# Patient Record
Sex: FEMALE | HISPANIC OR LATINO | Employment: UNEMPLOYED | URBAN - METROPOLITAN AREA
[De-identification: names, ages, dates, MRNs, and addresses within clinical notes are randomized per-mention and may not be internally consistent; named-entity substitution may affect disease eponyms.]

---

## 2020-11-28 ENCOUNTER — HOSPITAL ENCOUNTER (EMERGENCY)
Facility: HOSPITAL | Age: 50
Discharge: HOME | End: 2020-11-29
Admitting: EMERGENCY MEDICINE

## 2020-11-28 DIAGNOSIS — R53.83 FATIGUE, UNSPECIFIED TYPE: Primary | ICD-10-CM

## 2020-11-28 DIAGNOSIS — J40 BRONCHITIS: ICD-10-CM

## 2020-11-28 DIAGNOSIS — R06.00 DYSPNEA, UNSPECIFIED TYPE: ICD-10-CM

## 2020-11-28 DIAGNOSIS — R07.9 CHEST PAIN, UNSPECIFIED TYPE: ICD-10-CM

## 2020-11-28 LAB
ALBUMIN SERPL-MCNC: 4.7 G/DL (ref 3.5–5.2)
ALBUMIN/GLOB SERPL: 2.1 G/DL
ALP SERPL-CCNC: 88 U/L (ref 39–117)
ALT SERPL W P-5'-P-CCNC: 57 U/L (ref 1–33)
ANION GAP SERPL CALCULATED.3IONS-SCNC: 8 MMOL/L (ref 5–15)
AST SERPL-CCNC: 29 U/L (ref 1–32)
BASOPHILS # BLD AUTO: 0 10*3/MM3 (ref 0–0.2)
BASOPHILS NFR BLD AUTO: 0.6 % (ref 0–1.5)
BILIRUB SERPL-MCNC: 0.5 MG/DL (ref 0–1.2)
BUN SERPL-MCNC: 14 MG/DL (ref 6–20)
BUN/CREAT SERPL: 16.9 (ref 7–25)
CALCIUM SPEC-SCNC: 9.1 MG/DL (ref 8.6–10.5)
CHLORIDE SERPL-SCNC: 107 MMOL/L (ref 98–107)
CO2 SERPL-SCNC: 26 MMOL/L (ref 22–29)
CREAT SERPL-MCNC: 0.83 MG/DL (ref 0.57–1)
DEPRECATED RDW RBC AUTO: 42 FL (ref 37–54)
EOSINOPHIL # BLD AUTO: 0.2 10*3/MM3 (ref 0–0.4)
EOSINOPHIL NFR BLD AUTO: 2.5 % (ref 0.3–6.2)
ERYTHROCYTE [DISTWIDTH] IN BLOOD BY AUTOMATED COUNT: 12.7 % (ref 12.3–15.4)
GFR SERPL CREATININE-BSD FRML MDRD: 73 ML/MIN/1.73
GFR SERPL CREATININE-BSD FRML MDRD: 88 ML/MIN/1.73
GLOBULIN UR ELPH-MCNC: 2.2 GM/DL
GLUCOSE SERPL-MCNC: 124 MG/DL (ref 65–99)
HCT VFR BLD AUTO: 40.6 % (ref 34–46.6)
HGB BLD-MCNC: 13.4 G/DL (ref 12–15.9)
LYMPHOCYTES # BLD AUTO: 2.1 10*3/MM3 (ref 0.7–3.1)
LYMPHOCYTES NFR BLD AUTO: 26.3 % (ref 19.6–45.3)
MCH RBC QN AUTO: 31 PG (ref 26.6–33)
MCHC RBC AUTO-ENTMCNC: 33 G/DL (ref 31.5–35.7)
MCV RBC AUTO: 93.7 FL (ref 79–97)
MONOCYTES # BLD AUTO: 0.7 10*3/MM3 (ref 0.1–0.9)
MONOCYTES NFR BLD AUTO: 8.3 % (ref 5–12)
NEUTROPHILS NFR BLD AUTO: 4.9 10*3/MM3 (ref 1.7–7)
NEUTROPHILS NFR BLD AUTO: 62.3 % (ref 42.7–76)
NRBC BLD AUTO-RTO: 0.1 /100 WBC (ref 0–0.2)
PLATELET # BLD AUTO: 388 10*3/MM3 (ref 140–450)
PMV BLD AUTO: 7.8 FL (ref 6–12)
POTASSIUM SERPL-SCNC: 4.1 MMOL/L (ref 3.5–5.2)
PROT SERPL-MCNC: 6.9 G/DL (ref 6–8.5)
RBC # BLD AUTO: 4.33 10*6/MM3 (ref 3.77–5.28)
SODIUM SERPL-SCNC: 141 MMOL/L (ref 136–145)
TROPONIN T SERPL-MCNC: <0.01 NG/ML (ref 0–0.03)
WBC # BLD AUTO: 7.8 10*3/MM3 (ref 3.4–10.8)

## 2020-11-28 PROCEDURE — 80053 COMPREHEN METABOLIC PANEL: CPT | Performed by: EMERGENCY MEDICINE

## 2020-11-28 PROCEDURE — 87040 BLOOD CULTURE FOR BACTERIA: CPT | Performed by: EMERGENCY MEDICINE

## 2020-11-28 PROCEDURE — 85652 RBC SED RATE AUTOMATED: CPT | Performed by: EMERGENCY MEDICINE

## 2020-11-28 PROCEDURE — 85025 COMPLETE CBC W/AUTO DIFF WBC: CPT | Performed by: EMERGENCY MEDICINE

## 2020-11-28 PROCEDURE — 84484 ASSAY OF TROPONIN QUANT: CPT | Performed by: EMERGENCY MEDICINE

## 2020-11-28 PROCEDURE — 99283 EMERGENCY DEPT VISIT LOW MDM: CPT

## 2020-11-28 RX ADMIN — SODIUM CHLORIDE 500 ML: 0.9 INJECTION, SOLUTION INTRAVENOUS at 23:00

## 2020-11-29 ENCOUNTER — APPOINTMENT (OUTPATIENT)
Dept: CT IMAGING | Facility: HOSPITAL | Age: 50
End: 2020-11-29

## 2020-11-29 VITALS
RESPIRATION RATE: 18 BRPM | TEMPERATURE: 97 F | DIASTOLIC BLOOD PRESSURE: 70 MMHG | HEIGHT: 62 IN | SYSTOLIC BLOOD PRESSURE: 136 MMHG | BODY MASS INDEX: 31.93 KG/M2 | WEIGHT: 173.5 LBS | OXYGEN SATURATION: 98 % | HEART RATE: 80 BPM

## 2020-11-29 LAB
BILIRUB UR QL STRIP: NEGATIVE
CLARITY UR: CLEAR
COLOR UR: YELLOW
ERYTHROCYTE [SEDIMENTATION RATE] IN BLOOD: 9 MM/HR (ref 0–30)
GLUCOSE UR STRIP-MCNC: NEGATIVE MG/DL
HGB UR QL STRIP.AUTO: NEGATIVE
KETONES UR QL STRIP: NEGATIVE
LEUKOCYTE ESTERASE UR QL STRIP.AUTO: NEGATIVE
NITRITE UR QL STRIP: NEGATIVE
PH UR STRIP.AUTO: 7 [PH] (ref 5–8)
PROT UR QL STRIP: NEGATIVE
SP GR UR STRIP: 1.01 (ref 1–1.03)
UROBILINOGEN UR QL STRIP: NORMAL

## 2020-11-29 PROCEDURE — 81003 URINALYSIS AUTO W/O SCOPE: CPT | Performed by: EMERGENCY MEDICINE

## 2020-11-29 PROCEDURE — 96374 THER/PROPH/DIAG INJ IV PUSH: CPT

## 2020-11-29 PROCEDURE — 0 IOPAMIDOL PER 1 ML: Performed by: EMERGENCY MEDICINE

## 2020-11-29 PROCEDURE — 71275 CT ANGIOGRAPHY CHEST: CPT

## 2020-11-29 RX ORDER — METHYLPREDNISOLONE 4 MG/1
TABLET ORAL
Qty: 21 TABLET | Refills: 0 | Status: SHIPPED | OUTPATIENT
Start: 2020-11-29 | End: 2021-01-11

## 2020-11-29 RX ORDER — ALBUTEROL SULFATE 90 UG/1
2 AEROSOL, METERED RESPIRATORY (INHALATION) EVERY 4 HOURS PRN
Qty: 1 G | Refills: 0 | Status: SHIPPED | OUTPATIENT
Start: 2020-11-29 | End: 2020-12-06

## 2020-11-29 RX ORDER — LABETALOL HYDROCHLORIDE 5 MG/ML
20 INJECTION, SOLUTION INTRAVENOUS ONCE
Status: COMPLETED | OUTPATIENT
Start: 2020-11-29 | End: 2020-11-29

## 2020-11-29 RX ADMIN — LABETALOL 20 MG/4 ML (5 MG/ML) INTRAVENOUS SYRINGE 20 MG: at 00:45

## 2020-11-29 RX ADMIN — IOPAMIDOL 100 ML: 755 INJECTION, SOLUTION INTRAVENOUS at 01:24

## 2020-12-03 LAB
BACTERIA SPEC AEROBE CULT: NORMAL
BACTERIA SPEC AEROBE CULT: NORMAL

## 2021-01-11 ENCOUNTER — APPOINTMENT (OUTPATIENT)
Dept: GENERAL RADIOLOGY | Facility: HOSPITAL | Age: 51
End: 2021-01-11

## 2021-01-11 ENCOUNTER — APPOINTMENT (OUTPATIENT)
Dept: CARDIOLOGY | Facility: HOSPITAL | Age: 51
End: 2021-01-11

## 2021-01-11 ENCOUNTER — HOSPITAL ENCOUNTER (OUTPATIENT)
Facility: HOSPITAL | Age: 51
Setting detail: OBSERVATION
Discharge: HOME | End: 2021-01-14
Attending: EMERGENCY MEDICINE | Admitting: INTERNAL MEDICINE

## 2021-01-11 DIAGNOSIS — R00.1 SINUS BRADYCARDIA: ICD-10-CM

## 2021-01-11 DIAGNOSIS — R07.9 CHEST PAIN, UNSPECIFIED TYPE: Primary | ICD-10-CM

## 2021-01-11 LAB
ANION GAP SERPL CALCULATED.3IONS-SCNC: 11 MMOL/L (ref 5–15)
APTT PPP: 29.2 SECONDS (ref 24–31)
BASOPHILS # BLD AUTO: 0 10*3/MM3 (ref 0–0.2)
BASOPHILS NFR BLD AUTO: 0.3 % (ref 0–1.5)
BH CV ECHO MEAS - ACS: 2 CM
BH CV ECHO MEAS - AO MAX PG (FULL): 3.4 MMHG
BH CV ECHO MEAS - AO MAX PG: 8.1 MMHG
BH CV ECHO MEAS - AO MEAN PG (FULL): 1.6 MMHG
BH CV ECHO MEAS - AO MEAN PG: 3.4 MMHG
BH CV ECHO MEAS - AO ROOT AREA (BSA CORRECTED): 1.7
BH CV ECHO MEAS - AO ROOT AREA: 6.8 CM^2
BH CV ECHO MEAS - AO ROOT DIAM: 2.9 CM
BH CV ECHO MEAS - AO V2 MAX: 142.1 CM/SEC
BH CV ECHO MEAS - AO V2 MEAN: 84.1 CM/SEC
BH CV ECHO MEAS - AO V2 VTI: 29.8 CM
BH CV ECHO MEAS - AORTIC HR: 49.3 BPM
BH CV ECHO MEAS - AORTIC R-R: 1.2 SEC
BH CV ECHO MEAS - ASC AORTA: 2.6 CM
BH CV ECHO MEAS - AVA(I,A): 2.3 CM^2
BH CV ECHO MEAS - AVA(I,D): 2.3 CM^2
BH CV ECHO MEAS - AVA(V,A): 2.3 CM^2
BH CV ECHO MEAS - AVA(V,D): 2.3 CM^2
BH CV ECHO MEAS - BSA(HAYCOCK): 1.8 M^2
BH CV ECHO MEAS - BSA: 1.7 M^2
BH CV ECHO MEAS - BZI_BMI: 27.8 KILOGRAMS/M^2
BH CV ECHO MEAS - BZI_METRIC_HEIGHT: 160 CM
BH CV ECHO MEAS - BZI_METRIC_WEIGHT: 71.2 KG
BH CV ECHO MEAS - CI(AO): 5.7 L/MIN/M^2
BH CV ECHO MEAS - CI(LVOT): 1.9 L/MIN/M^2
BH CV ECHO MEAS - CO(AO): 10 L/MIN
BH CV ECHO MEAS - CO(LVOT): 3.4 L/MIN
BH CV ECHO MEAS - EDV(CUBED): 125.9 ML
BH CV ECHO MEAS - EDV(MOD-SP4): 47.9 ML
BH CV ECHO MEAS - EDV(TEICH): 118.9 ML
BH CV ECHO MEAS - EF(CUBED): 75.9 %
BH CV ECHO MEAS - EF(MOD-BP): 65 %
BH CV ECHO MEAS - EF(MOD-SP4): 64.8 %
BH CV ECHO MEAS - EF(TEICH): 67.6 %
BH CV ECHO MEAS - ESV(CUBED): 30.3 ML
BH CV ECHO MEAS - ESV(MOD-SP4): 16.9 ML
BH CV ECHO MEAS - ESV(TEICH): 38.5 ML
BH CV ECHO MEAS - FS: 37.8 %
BH CV ECHO MEAS - IVS/LVPW: 0.65
BH CV ECHO MEAS - IVSD: 0.73 CM
BH CV ECHO MEAS - LA DIMENSION(2D): 3.3 CM
BH CV ECHO MEAS - LV DIASTOLIC VOL/BSA (35-75): 27.4 ML/M^2
BH CV ECHO MEAS - LV MASS(C)D: 164.8 GRAMS
BH CV ECHO MEAS - LV MASS(C)DI: 94.5 GRAMS/M^2
BH CV ECHO MEAS - LV MAX PG: 4.7 MMHG
BH CV ECHO MEAS - LV MEAN PG: 1.9 MMHG
BH CV ECHO MEAS - LV SYSTOLIC VOL/BSA (12-30): 9.7 ML/M^2
BH CV ECHO MEAS - LV V1 MAX: 108.7 CM/SEC
BH CV ECHO MEAS - LV V1 MEAN: 60.3 CM/SEC
BH CV ECHO MEAS - LV V1 VTI: 23 CM
BH CV ECHO MEAS - LVIDD: 5 CM
BH CV ECHO MEAS - LVIDS: 3.1 CM
BH CV ECHO MEAS - LVOT AREA: 3 CM^2
BH CV ECHO MEAS - LVOT DIAM: 1.9 CM
BH CV ECHO MEAS - LVPWD: 1.1 CM
BH CV ECHO MEAS - MV A MAX VEL: 51.9 CM/SEC
BH CV ECHO MEAS - MV DEC SLOPE: 156.5 CM/SEC^2
BH CV ECHO MEAS - MV DEC TIME: 0.37 SEC
BH CV ECHO MEAS - MV E MAX VEL: 58.7 CM/SEC
BH CV ECHO MEAS - MV E/A: 1.1
BH CV ECHO MEAS - MV MAX PG: 1.9 MMHG
BH CV ECHO MEAS - MV MEAN PG: 0.67 MMHG
BH CV ECHO MEAS - MV V2 MAX: 69.6 CM/SEC
BH CV ECHO MEAS - MV V2 MEAN: 38.3 CM/SEC
BH CV ECHO MEAS - MV V2 VTI: 28.6 CM
BH CV ECHO MEAS - MVA(VTI): 2.4 CM^2
BH CV ECHO MEAS - PA ACC TIME: 0.15 SEC
BH CV ECHO MEAS - PA MAX PG (FULL): 1.3 MMHG
BH CV ECHO MEAS - PA MAX PG: 3.4 MMHG
BH CV ECHO MEAS - PA MEAN PG (FULL): 0.82 MMHG
BH CV ECHO MEAS - PA MEAN PG: 1.9 MMHG
BH CV ECHO MEAS - PA PR(ACCEL): 12.2 MMHG
BH CV ECHO MEAS - PA V2 MAX: 91.8 CM/SEC
BH CV ECHO MEAS - PA V2 MEAN: 64 CM/SEC
BH CV ECHO MEAS - PA V2 VTI: 23.9 CM
BH CV ECHO MEAS - PI END-D VEL: 99.4 CM/SEC
BH CV ECHO MEAS - PI MAX PG: 8.4 MMHG
BH CV ECHO MEAS - PI MAX VEL: 144.6 CM/SEC
BH CV ECHO MEAS - PVA(I,A): 3.9 CM^2
BH CV ECHO MEAS - PVA(I,D): 3.9 CM^2
BH CV ECHO MEAS - PVA(V,A): 4 CM^2
BH CV ECHO MEAS - PVA(V,D): 4 CM^2
BH CV ECHO MEAS - QP/QS: 1.4
BH CV ECHO MEAS - RAP SYSTOLE: 3 MMHG
BH CV ECHO MEAS - RV MAX PG: 2 MMHG
BH CV ECHO MEAS - RV MEAN PG: 1 MMHG
BH CV ECHO MEAS - RV V1 MAX: 71.3 CM/SEC
BH CV ECHO MEAS - RV V1 MEAN: 46.9 CM/SEC
BH CV ECHO MEAS - RV V1 VTI: 18.4 CM
BH CV ECHO MEAS - RVDD: 2.1 CM
BH CV ECHO MEAS - RVOT AREA: 5.1 CM^2
BH CV ECHO MEAS - RVOT DIAM: 2.5 CM
BH CV ECHO MEAS - RVSP: 15.9 MMHG
BH CV ECHO MEAS - SI(AO): 116.6 ML/M^2
BH CV ECHO MEAS - SI(CUBED): 54.7 ML/M^2
BH CV ECHO MEAS - SI(LVOT): 39 ML/M^2
BH CV ECHO MEAS - SI(MOD-SP4): 17.8 ML/M^2
BH CV ECHO MEAS - SI(TEICH): 46.1 ML/M^2
BH CV ECHO MEAS - SV(AO): 203.4 ML
BH CV ECHO MEAS - SV(CUBED): 95.5 ML
BH CV ECHO MEAS - SV(LVOT): 68.1 ML
BH CV ECHO MEAS - SV(MOD-SP4): 31 ML
BH CV ECHO MEAS - SV(RVOT): 93.6 ML
BH CV ECHO MEAS - SV(TEICH): 80.4 ML
BH CV ECHO MEAS - TR MAX VEL: 179.4 CM/SEC
BUN SERPL-MCNC: 18 MG/DL (ref 6–20)
BUN/CREAT SERPL: 25 (ref 7–25)
CALCIUM SPEC-SCNC: 8.9 MG/DL (ref 8.6–10.5)
CHLORIDE SERPL-SCNC: 103 MMOL/L (ref 98–107)
CHOLEST SERPL-MCNC: 127 MG/DL (ref 0–200)
CK SERPL-CCNC: 73 U/L (ref 20–180)
CO2 SERPL-SCNC: 25 MMOL/L (ref 22–29)
CREAT SERPL-MCNC: 0.72 MG/DL (ref 0.57–1)
DEPRECATED RDW RBC AUTO: 42.4 FL (ref 37–54)
EOSINOPHIL # BLD AUTO: 0.1 10*3/MM3 (ref 0–0.4)
EOSINOPHIL NFR BLD AUTO: 1.1 % (ref 0.3–6.2)
ERYTHROCYTE [DISTWIDTH] IN BLOOD BY AUTOMATED COUNT: 13.2 % (ref 12.3–15.4)
GFR SERPL CREATININE-BSD FRML MDRD: 104 ML/MIN/1.73
GFR SERPL CREATININE-BSD FRML MDRD: 86 ML/MIN/1.73
GLUCOSE SERPL-MCNC: 95 MG/DL (ref 65–99)
HCT VFR BLD AUTO: 40.2 % (ref 34–46.6)
HDLC SERPL-MCNC: 43 MG/DL (ref 40–60)
HGB BLD-MCNC: 13.6 G/DL (ref 12–15.9)
INR PPP: 1.06 (ref 0.93–1.1)
LDLC SERPL CALC-MCNC: 70 MG/DL (ref 0–100)
LDLC/HDLC SERPL: 1.63 {RATIO}
LYMPHOCYTES # BLD AUTO: 1.5 10*3/MM3 (ref 0.7–3.1)
LYMPHOCYTES NFR BLD AUTO: 20.4 % (ref 19.6–45.3)
MAGNESIUM SERPL-MCNC: 2 MG/DL (ref 1.6–2.6)
MCH RBC QN AUTO: 31.1 PG (ref 26.6–33)
MCHC RBC AUTO-ENTMCNC: 33.8 G/DL (ref 31.5–35.7)
MCV RBC AUTO: 92 FL (ref 79–97)
MONOCYTES # BLD AUTO: 0.4 10*3/MM3 (ref 0.1–0.9)
MONOCYTES NFR BLD AUTO: 5.8 % (ref 5–12)
NEUTROPHILS NFR BLD AUTO: 5.3 10*3/MM3 (ref 1.7–7)
NEUTROPHILS NFR BLD AUTO: 72.4 % (ref 42.7–76)
NRBC BLD AUTO-RTO: 0.1 /100 WBC (ref 0–0.2)
NT-PROBNP SERPL-MCNC: 28 PG/ML (ref 0–900)
PHOSPHATE SERPL-MCNC: 4 MG/DL (ref 2.5–4.5)
PLATELET # BLD AUTO: 243 10*3/MM3 (ref 140–450)
PMV BLD AUTO: 8.8 FL (ref 6–12)
POTASSIUM SERPL-SCNC: 4.2 MMOL/L (ref 3.5–5.2)
PROTHROMBIN TIME: 11.6 SECONDS (ref 9.6–11.7)
QT INTERVAL: 436 MS
RBC # BLD AUTO: 4.38 10*6/MM3 (ref 3.77–5.28)
SODIUM SERPL-SCNC: 139 MMOL/L (ref 136–145)
TRIGL SERPL-MCNC: 70 MG/DL (ref 0–150)
TROPONIN T SERPL-MCNC: <0.01 NG/ML (ref 0–0.03)
TROPONIN T SERPL-MCNC: <0.01 NG/ML (ref 0–0.03)
VLDLC SERPL-MCNC: 14 MG/DL (ref 5–40)
WBC # BLD AUTO: 7.4 10*3/MM3 (ref 3.4–10.8)

## 2021-01-11 PROCEDURE — G0378 HOSPITAL OBSERVATION PER HR: HCPCS

## 2021-01-11 PROCEDURE — 83735 ASSAY OF MAGNESIUM: CPT | Performed by: INTERNAL MEDICINE

## 2021-01-11 PROCEDURE — 84484 ASSAY OF TROPONIN QUANT: CPT | Performed by: EMERGENCY MEDICINE

## 2021-01-11 PROCEDURE — 83880 ASSAY OF NATRIURETIC PEPTIDE: CPT | Performed by: INTERNAL MEDICINE

## 2021-01-11 PROCEDURE — 80061 LIPID PANEL: CPT | Performed by: INTERNAL MEDICINE

## 2021-01-11 PROCEDURE — 85025 COMPLETE CBC W/AUTO DIFF WBC: CPT | Performed by: EMERGENCY MEDICINE

## 2021-01-11 PROCEDURE — 93005 ELECTROCARDIOGRAM TRACING: CPT | Performed by: EMERGENCY MEDICINE

## 2021-01-11 PROCEDURE — 82550 ASSAY OF CK (CPK): CPT | Performed by: INTERNAL MEDICINE

## 2021-01-11 PROCEDURE — 93306 TTE W/DOPPLER COMPLETE: CPT

## 2021-01-11 PROCEDURE — 80048 BASIC METABOLIC PNL TOTAL CA: CPT | Performed by: EMERGENCY MEDICINE

## 2021-01-11 PROCEDURE — 84100 ASSAY OF PHOSPHORUS: CPT | Performed by: INTERNAL MEDICINE

## 2021-01-11 PROCEDURE — 71045 X-RAY EXAM CHEST 1 VIEW: CPT

## 2021-01-11 PROCEDURE — 99219 PR INITIAL OBSERVATION CARE/DAY 50 MINUTES: CPT | Performed by: INTERNAL MEDICINE

## 2021-01-11 PROCEDURE — 99284 EMERGENCY DEPT VISIT MOD MDM: CPT

## 2021-01-11 PROCEDURE — 84484 ASSAY OF TROPONIN QUANT: CPT | Performed by: INTERNAL MEDICINE

## 2021-01-11 PROCEDURE — 93306 TTE W/DOPPLER COMPLETE: CPT | Performed by: INTERNAL MEDICINE

## 2021-01-11 PROCEDURE — 85610 PROTHROMBIN TIME: CPT | Performed by: EMERGENCY MEDICINE

## 2021-01-11 PROCEDURE — 85730 THROMBOPLASTIN TIME PARTIAL: CPT | Performed by: EMERGENCY MEDICINE

## 2021-01-11 RX ORDER — SODIUM CHLORIDE 0.9 % (FLUSH) 0.9 %
10 SYRINGE (ML) INJECTION EVERY 12 HOURS SCHEDULED
Status: DISCONTINUED | OUTPATIENT
Start: 2021-01-11 | End: 2021-01-14 | Stop reason: HOSPADM

## 2021-01-11 RX ORDER — ENALAPRIL MALEATE 10 MG/1
10 TABLET ORAL DAILY
COMMUNITY

## 2021-01-11 RX ORDER — SODIUM CHLORIDE 0.9 % (FLUSH) 0.9 %
10 SYRINGE (ML) INJECTION AS NEEDED
Status: DISCONTINUED | OUTPATIENT
Start: 2021-01-11 | End: 2021-01-14 | Stop reason: HOSPADM

## 2021-01-11 RX ADMIN — Medication 10 ML: at 20:00

## 2021-01-11 NOTE — H&P
[facility] Medicine Services      Patient Name: Julisa Marinelli  : 1970  MRN: [id number]  Primary Care Physician: Alexandru, No Known  Date of admission: 2021    Patient Care Team:  Provider, No Known as PCP - General          Subjective   History Present Illness     Chief Complaint:   Chief Complaint   Patient presents with   • Slow Heart Rate     dizzy, chest pain, sent in by [facility] states her heart rate there was 38       Ms. Matthew Marinelli is a 50 y.o.  hyspanic female with PMH significant for HTN,  presented with a slow heart rate.  Patient was at her primary doctor's office and had a heart rate in the 30s and they sent her to the emergency room.  Patient states she has been having episodes of chest pressure, shortness of breath, dizziness that are worse with exertion.  She denies any history of heart disease.  She denies any other alleviating or exacerbating factors.  She denies any chest pain currently.    Family history non contributory   No Tobaco or ETOH    Review of Systems   Constitution: Negative.   HENT: Negative.    Eyes: Negative.    Cardiovascular: Positive for chest pain.   Respiratory: Negative.    Endocrine: Negative.    Hematologic/Lymphatic: Negative.    Skin: Negative.    Musculoskeletal: Negative.    Gastrointestinal: Negative.    Genitourinary: Negative.    Neurological: Negative.    Psychiatric/Behavioral: Negative.    Allergic/Immunologic: Negative.    All other systems reviewed and are negative.    Personal History     Past Medical History:   Past Medical History:   Diagnosis Date   • Hypertension      Surgical History:    History reviewed. No pertinent surgical history.    Family History: family history is not on file. Otherwise pertinent FHx was reviewed and unremarkable.     Social History:  reports that she has never smoked. She has never used smokeless tobacco. She reports previous alcohol use. She reports previous drug  use.    Medications:  Prior to Admission medications    Medication Sig Start Date End Date Taking? Authorizing Provider   ENALAPRIL MALEATE PO Take 10 mg by mouth Daily.   Yes Provider, MD Kandace   methylPREDNISolone (MEDROL) 4 MG dose pack Take as directed on package instructions. 11/29/20   Lexus Andre NP       Allergies:    Allergies   Allergen Reactions   • Penicillins Unknown - Low Severity       Objective   Objective     Vital Signs  Temp:  [97.9 °F (36.6 °C)] 97.9 °F (36.6 °C)  Heart Rate:  [42-61] 47  Resp:  [22] 22  BP: (103-141)/(53-85) 103/55  SpO2:  [96 %-100 %] 98 %  on   ;      Body mass index is 27.92 kg/m².    Physical Exam  Vitals signs and nursing note reviewed.   Constitutional:       General: She is not in acute distress.     Appearance: Normal appearance. She is well-developed. She is not ill-appearing, toxic-appearing or diaphoretic.   HENT:      Head: Normocephalic and atraumatic.      Nose: Nose normal. No congestion or rhinorrhea.      Mouth/Throat:      Mouth: Mucous membranes are moist.      Pharynx: No oropharyngeal exudate.   Eyes:      General: No scleral icterus.        Right eye: No discharge.         Left eye: No discharge.      Extraocular Movements: Extraocular movements intact.      Conjunctiva/sclera: Conjunctivae normal.      Pupils: Pupils are equal, round, and reactive to light.   Neck:      Musculoskeletal: Normal range of motion and neck supple.      Thyroid: No thyromegaly.      Vascular: No JVD.      Trachea: No tracheal deviation.   Cardiovascular:      Rate and Rhythm: Normal rate and regular rhythm.      Pulses: Normal pulses.      Heart sounds: Normal heart sounds. No murmur. No gallop.    Pulmonary:      Effort: Pulmonary effort is normal. No respiratory distress.      Breath sounds: Normal breath sounds. No wheezing or rales.   Abdominal:      General: Bowel sounds are normal. There is no distension.      Palpations: Abdomen is soft.      Tenderness: There  is no abdominal tenderness. There is no guarding.   Musculoskeletal: Normal range of motion.         General: No tenderness, deformity or signs of injury.      Right lower leg: No edema.      Left lower leg: No edema.   Skin:     General: Skin is warm and dry.      Coloration: Skin is not jaundiced or pale.      Findings: No bruising, erythema or rash.   Neurological:      General: No focal deficit present.      Mental Status: She is alert and oriented to person, place, and time. Mental status is at baseline.      Cranial Nerves: No cranial nerve deficit.      Sensory: No sensory deficit.      Motor: No weakness or abnormal muscle tone.      Coordination: Coordination normal.   Psychiatric:         Mood and Affect: Mood normal.         Behavior: Behavior normal.         Thought Content: Thought content normal.         Judgment: Judgment normal.       Results Review:  I have personally reviewed most recent lab results and agree with findings.    Results from last 7 days   Lab Units 01/11/21  1409   WBC 10*3/mm3 7.40   HEMOGLOBIN g/dL 13.6   HEMATOCRIT % 40.2   PLATELETS 10*3/mm3 243   INR  1.06     Results from last 7 days   Lab Units 01/11/21  1409   SODIUM mmol/L 139   POTASSIUM mmol/L 4.2   CHLORIDE mmol/L 103   CO2 mmol/L 25.0   BUN mg/dL 18   CREATININE mg/dL 0.72   GLUCOSE mg/dL 95   CALCIUM mg/dL 8.9   TROPONIN T ng/mL <0.010     Estimated Creatinine Clearance: 88.5 mL/min (by C-G formula based on SCr of 0.72 mg/dL).  Brief Urine Lab Results  (Last result in the past 365 days)      Color   Clarity   Blood   Leuk Est   Nitrite   Protein   CREAT   Urine HCG        11/29/20 0034 Yellow Clear Negative Negative Negative Negative               Microbiology Results (last 10 days)     ** No results found for the last 240 hours. **          ECG/EMG Results (most recent)     Procedure Component Value Units Date/Time    ECG 12 Lead [[id number]] Collected: 01/11/21 1345     Updated: 01/11/21 1348     QT Interval 436 ms      Narrative:      HEART RATE= 46  bpm  RR Interval= 1304  ms  SC Interval= 153  ms  P Horizontal Axis= 16  deg  P Front Axis= 35  deg  QRSD Interval= 86  ms  QT Interval= 436  ms  QRS Axis= 56  deg  T Wave Axis= 28  deg  - OTHERWISE NORMAL ECG -  Sinus bradycardia  Electronically Signed By:   Date and Time of Study: 2021-01-11 13:45:52                    Xr Chest 1 View    Result Date: 1/11/2021  No acute chest finding.  Electronically Signed By-Silivna Wing MD On:1/11/2021 2:02 PM This report was finalized on [id number] by  Silvina Wing MD.        Estimated Creatinine Clearance: 88.5 mL/min (by C-G formula based on SCr of 0.72 mg/dL).    Assessment/Plan   Assessment/Plan     Active Hospital Problems    Diagnosis  POA   • Chest pain [R07.9]  Yes      Resolved Hospital Problems   No resolved problems to display.     Bradycardia-  Hypertension -    Plan-  Serial enzymes   Stress test  Holter monitor   Cardiology consulted  Echocardiogram       VTE Prophylaxis -   Mechanical Order History:     None      Pharmalogical Order History:     None          CODE STATUS:    There are no questions and answers to display.       This patient has been examined wearing appropriate Personal Protective Equipment and discussed with hospital infection control department. 01/11/21      I discussed the patient's findings and my recommendations with patient and family.      Signature:Electronically signed by Edilberto Caputo MD, 01/11/21, 4:40 PM EST.    [facility]ist Team

## 2021-01-11 NOTE — ED NOTES
Patient has a  in room with her, patients daughter Corinne Castro declined  on IPAD.  Patient states that she started feeling fatigued in November, in general not feeling well.  These symptoms continued and her blood pressure was up around 200 systolic.  The patient has dizziness, pressure on chest, bloated abdomen, complaining of belching and flatulence frequently, increased frequency of urination but does not urinate much.  Patient also has a dry cough when she feels the urge to urinate, she was told it had to do with a cyst or mass in her uterus. Pressure in ears, pain in throat and swollen lymph nodes, constipation.     Lorna Dodge, RN  01/11/21 4010

## 2021-01-11 NOTE — ED PROVIDER NOTES
"Subjective   Chief complaint: Slow heart rate    50-year-old female presents with a slow heart rate.  Patient was at her primary doctor's office and had a heart rate in the 30s and they sent her to the emergency room.  Patient states she has been having episodes of chest pressure, shortness of breath, dizziness that are worse with exertion.  She denies any history of heart disease.  She denies any other alleviating or exacerbating factors.  She denies any chest pain currently.      History provided by:  Patient      Review of Systems   Constitutional: Negative for fever.   HENT: Negative for congestion and sore throat.    Eyes: Negative for redness.   Respiratory: Positive for shortness of breath. Negative for cough.    Cardiovascular: Positive for chest pain.   Gastrointestinal: Negative for abdominal pain, diarrhea and vomiting.   Genitourinary: Negative for dysuria.   Musculoskeletal: Negative for back pain.   Skin: Negative for rash.   Neurological: Positive for dizziness. Negative for headaches.   Psychiatric/Behavioral: Negative for confusion.       Past Medical History:   Diagnosis Date   • Hypertension        Allergies   Allergen Reactions   • Penicillins Unknown - Low Severity       History reviewed. No pertinent surgical history.    History reviewed. No pertinent family history.    Social History     Socioeconomic History   • Marital status: Significant Other     Spouse name: Not on file   • Number of children: Not on file   • Years of education: Not on file   • Highest education level: Not on file   Tobacco Use   • Smoking status: Never Smoker   • Smokeless tobacco: Never Used   Substance and Sexual Activity   • Alcohol use: Not Currently     Frequency: Never   • Drug use: Not Currently   • Sexual activity: Defer       /53   Pulse (!) 45   Temp 97.9 °F (36.6 °C)   Resp 22   Ht 160 cm (63\")   Wt 71.5 kg (157 lb 10.1 oz)   LMP 12/16/2020   SpO2 98%   BMI 27.92 kg/m²       Objective   Physical " Exam  Vitals signs and nursing note reviewed.   Constitutional:       Appearance: She is well-developed.   HENT:      Head: Normocephalic and atraumatic.   Eyes:      Extraocular Movements: Extraocular movements intact.      Pupils: Pupils are equal, round, and reactive to light.   Neck:      Musculoskeletal: Normal range of motion and neck supple.   Cardiovascular:      Rate and Rhythm: Regular rhythm. Bradycardia present.      Heart sounds: Normal heart sounds.   Pulmonary:      Effort: Pulmonary effort is normal. No respiratory distress.      Breath sounds: Normal breath sounds.   Abdominal:      General: Bowel sounds are normal.      Palpations: Abdomen is soft.      Tenderness: There is no abdominal tenderness.   Musculoskeletal: Normal range of motion.   Skin:     General: Skin is warm and dry.   Neurological:      General: No focal deficit present.      Mental Status: She is alert and oriented to person, place, and time.         Procedures           ED Course      Results for orders placed or performed during the hospital encounter of 01/11/21   Basic Metabolic Panel    Specimen: Blood   Result Value Ref Range    Glucose 95 65 - 99 mg/dL    BUN 18 6 - 20 mg/dL    Creatinine 0.72 0.57 - 1.00 mg/dL    Sodium 139 136 - 145 mmol/L    Potassium 4.2 3.5 - 5.2 mmol/L    Chloride 103 98 - 107 mmol/L    CO2 25.0 22.0 - 29.0 mmol/L    Calcium 8.9 8.6 - 10.5 mg/dL    eGFR  African Amer 104 >60 mL/min/1.73    eGFR Non African Amer 86 >60 mL/min/1.73    BUN/Creatinine Ratio 25.0 7.0 - 25.0    Anion Gap 11.0 5.0 - 15.0 mmol/L   Protime-INR    Specimen: Blood   Result Value Ref Range    Protime 11.6 9.6 - 11.7 Seconds    INR 1.06 0.93 - 1.10   aPTT    Specimen: Blood   Result Value Ref Range    PTT 29.2 24.0 - 31.0 seconds   Troponin    Specimen: Blood   Result Value Ref Range    Troponin T <0.010 0.000 - 0.030 ng/mL   CBC Auto Differential    Specimen: Blood   Result Value Ref Range    WBC 7.40 3.40 - 10.80 10*3/mm3    RBC  4.38 3.77 - 5.28 10*6/mm3    Hemoglobin 13.6 12.0 - 15.9 g/dL    Hematocrit 40.2 34.0 - 46.6 %    MCV 92.0 79.0 - 97.0 fL    MCH 31.1 26.6 - 33.0 pg    MCHC 33.8 31.5 - 35.7 g/dL    RDW 13.2 12.3 - 15.4 %    RDW-SD 42.4 37.0 - 54.0 fl    MPV 8.8 6.0 - 12.0 fL    Platelets 243 140 - 450 10*3/mm3    Neutrophil % 72.4 42.7 - 76.0 %    Lymphocyte % 20.4 19.6 - 45.3 %    Monocyte % 5.8 5.0 - 12.0 %    Eosinophil % 1.1 0.3 - 6.2 %    Basophil % 0.3 0.0 - 1.5 %    Neutrophils, Absolute 5.30 1.70 - 7.00 10*3/mm3    Lymphocytes, Absolute 1.50 0.70 - 3.10 10*3/mm3    Monocytes, Absolute 0.40 0.10 - 0.90 10*3/mm3    Eosinophils, Absolute 0.10 0.00 - 0.40 10*3/mm3    Basophils, Absolute 0.00 0.00 - 0.20 10*3/mm3    nRBC 0.1 0.0 - 0.2 /100 WBC   ECG 12 Lead   Result Value Ref Range    QT Interval 436 ms     Xr Chest 1 View    Result Date: 1/11/2021  No acute chest finding.  Electronically Signed By-Silvina Wing MD On:1/11/2021 2:02 PM This report was finalized on [id number] by  Silvina Wing MD.         My interpretation of EKG shows sinus bradycardia, rate of 46, no ST elevation                                MDM   Patient had the above evaluation.  Results were discussed with the patient.  Patient remained well-appearing in the emergency room.  She does have bradycardia with a heart rate in the 40s.  EKG just shows sinus bradycardia with no other abnormalities.  Troponin is negative.  Chest x-ray shows no acute disease.  Patient will be admitted for further evaluation of her chest pain and bradycardia.  I discussed with the hospitalist who agreed to admit.      Final diagnoses:   Chest pain, unspecified type   Sinus bradycardia            Leoncio Byrd MD  01/11/21 1559

## 2021-01-12 LAB
MAGNESIUM SERPL-MCNC: 2.3 MG/DL (ref 1.6–2.6)
PHOSPHATE SERPL-MCNC: 3.8 MG/DL (ref 2.5–4.5)
SARS-COV-2 RNA PNL SPEC NAA+PROBE: DETECTED

## 2021-01-12 PROCEDURE — U0003 INFECTIOUS AGENT DETECTION BY NUCLEIC ACID (DNA OR RNA); SEVERE ACUTE RESPIRATORY SYNDROME CORONAVIRUS 2 (SARS-COV-2) (CORONAVIRUS DISEASE [COVID-19]), AMPLIFIED PROBE TECHNIQUE, MAKING USE OF HIGH THROUGHPUT TECHNOLOGIES AS DESCRIBED BY CMS-2020-01-R: HCPCS | Performed by: INTERNAL MEDICINE

## 2021-01-12 PROCEDURE — 83735 ASSAY OF MAGNESIUM: CPT | Performed by: INTERNAL MEDICINE

## 2021-01-12 PROCEDURE — 99204 OFFICE O/P NEW MOD 45 MIN: CPT | Performed by: INTERNAL MEDICINE

## 2021-01-12 PROCEDURE — 84100 ASSAY OF PHOSPHORUS: CPT | Performed by: INTERNAL MEDICINE

## 2021-01-12 PROCEDURE — 99225 PR SBSQ OBSERVATION CARE/DAY 25 MINUTES: CPT | Performed by: INTERNAL MEDICINE

## 2021-01-12 PROCEDURE — G0378 HOSPITAL OBSERVATION PER HR: HCPCS

## 2021-01-12 RX ORDER — ENALAPRIL MALEATE 5 MG/1
5 TABLET ORAL
Status: DISCONTINUED | OUTPATIENT
Start: 2021-01-12 | End: 2021-01-14 | Stop reason: HOSPADM

## 2021-01-12 RX ORDER — ENALAPRIL MALEATE 5 MG/1
10 TABLET ORAL
Status: DISCONTINUED | OUTPATIENT
Start: 2021-01-12 | End: 2021-01-12

## 2021-01-12 RX ADMIN — Medication 10 ML: at 21:57

## 2021-01-12 RX ADMIN — Medication 10 ML: at 08:30

## 2021-01-12 RX ADMIN — ENALAPRIL MALEATE 5 MG: 5 TABLET ORAL at 12:00

## 2021-01-12 NOTE — PROGRESS NOTES
Discharge Planning Assessment  [facility]     Patient Name: Julisa Marinelli  MRN: [id number]  Today's Date: 1/12/2021    Admit Date: 1/11/2021    Discharge Needs Assessment     Row Name 01/12/21 1421       Living Environment    Lives With  -- per daughter patient lives with daughter, and her boyfriend    Current Living Arrangements  home/apartment/condo    Potentially Unsafe Housing Conditions  unable to assess    Primary Care Provided by  self    Provides Primary Care For  no one    Family Caregiver if Needed  child(petrona), adult    Quality of Family Relationships  unable to assess       Resource/Environmental Concerns    Resource/Environmental Concerns  financial    Financial Concerns  insurance, none       Transition Planning    Patient/Family Anticipates Transition to  home with family    Patient/Family Anticipated Services at Transition      Transportation Anticipated  car, drives self;family or friend will provide       Discharge Needs Assessment    Readmission Within the Last 30 Days  no previous admission in last 30 days    Equipment Currently Used at Home  none    Concerns to be Addressed  discharge planning    Anticipated Changes Related to Illness  none    Provided Post Acute Provider List?  N/A    N/A Provider List Comment  no needs identified at this time        Discharge Plan     Row Name 01/12/21 1428       Plan    Plan  return home with family    Patient/Family in Agreement with Plan  yes    Plan Comments  per charge nurse mae patient will not do  phone to speak to staff; mae states to call room and speak to daughter; daughter states that patient lives with the daughter and her boyfriend; she drives and has no dme; she has no insurance or primary md; kirsty luis to follow for this       Carol naegele rn  Case management  Office number [phone]  Cell phone [phone]

## 2021-01-12 NOTE — PROGRESS NOTES
The patient was admitted to the hospital with bradycardia.  Apparently she had a positive Covid test in September 2020.  She had routine screen this admission and was positive for COVID-19.  The patient does not have a symptoms of COVID-19 infection with no fever and no hypoxia.  She was noted to have significant bradycardia.  Since the current screen was done more than 3 months after the initial 1.  There is a possibility of asymptomatic reinfection.  At this point I recommend to isolate patient for 10 days from today.  If there is urgent procedures can be done otherwise can be delayed after 10 days.  There is no need for specific treatment for COVID-19 infection

## 2021-01-12 NOTE — CONSULTS
"Cardiology Consult Note      REQUESTING PHYSICIAN    Edilberto Caputo MD    PATIENT IDENTIFICATION  Name: Julisa Marinelli  Age: 50 y.o.  Sex: female  :  1970  MRN: [id number]             REASON FOR CONSULTATION:  50-year-old  female with no known history of coronary occlusive disease, no prior cardiac evaluation.  Patient does have history of hypertension currently treated with enalapril.      CC:  Low heart rate    HISTORY OF PRESENT ILLNESS:   Patient speaks very little English.  Her daughter at the bedside provides interpretation.  According to the daughter, patient was seen in her primary care practitioner's office and was noted to have a heart rate in the 30s.  She was sent to the emergency department for further evaluation.  Patient reports recent episodes of mild chest pressure with no radiation or associated symptoms.  She has occasional episodes of mild dizziness without any presyncopal or syncopal episodes that primarily occur with exertion.  Work-up in the ER was initiated-troponin has been negative x2.  EKG shows sinus bradycardia with rate of 46.  Upon further conversation, the patient does take a plethora of nonprescribed \"dietary supplements\" that are obtained for her by a friend from [place].  These supplements were personally reviewed by me, some of which contain electrolyte supplements with magnesium, calcium, phosphorus.  Patient states she has problems with constipation and these help her symptoms.  She has been compliant with her blood pressure medicine.  Daughter states patient has reported chest discomfort in the past when her blood pressures been elevated.    2D echocardiogram has been performed with normal LV systolic function and no abnormal findings.      REVIEW OF SYSTEMS:  Pertinent items are noted in HPI, all other systems reviewed and negative    OBJECTIVE   Patient currently resting comfortably in bed    ASSESSMENT/PLAN  Bradycardia  History of " "hypertension  Use of multiple nonprescription, over-the-counter supplements  Chest discomfort-atypical  Dizziness    Recommendations:  Patient takes a significant amount of supplemental substances that are over-the-counter.  These could be contributing to her bradycardia arrhythmia.  Stop supplemental medications  Serial cardiac biomarkers negative  Consider ischemic work-up when Covid negative  Further recommendations as patient's course progresses.      Vital Signs  Visit Vitals  /95 (BP Location: Left arm, Patient Position: Lying)   Pulse (!) 47   Temp 98.1 °F (36.7 °C) (Oral)   Resp 15   Ht 160 cm (63\")   Wt 71.2 kg (157 lb)   LMP 12/16/2020   SpO2 98%   BMI 27.81 kg/m²     Oxygen Therapy  SpO2: 98 %  Pulse Oximetry Type: Continuous  Device (Oxygen Therapy): room air  Flowsheet Rows      First Filed Value   Admission Height  160 cm (63\") Documented at 01/11/2021 1328   Admission Weight  71.5 kg (157 lb 10.1 oz) Documented at 01/11/2021 1328        Intake & Output (last 3 days)     None        Lines, Drains & Airways    Active LDAs     Name:   Placement date:   Placement time:   Site:   Days:    Peripheral IV 01/11/21 1409 Right Antecubital   01/11/21    1409    Antecubital   less than 1                MEDICAL HISTORY    Past Medical History:   Diagnosis Date   • Hypertension         SURGICAL HISTORY    History reviewed. No pertinent surgical history.     FAMILY HISTORY    History reviewed. No pertinent family history.    SOCIAL HISTORY    Social History     Tobacco Use   • Smoking status: Never Smoker   • Smokeless tobacco: Never Used   Substance Use Topics   • Alcohol use: Not Currently     Frequency: Never        ALLERGIES    Allergies   Allergen Reactions   • Penicillins Unknown - Low Severity              /95 (BP Location: Left arm, Patient Position: Lying)   Pulse (!) 47   Temp 98.1 °F (36.7 °C) (Oral)   Resp 15   Ht 160 cm (63\")   Wt 71.2 kg (157 lb)   LMP 12/16/2020   SpO2 98%   BMI " 27.81 kg/m²   Intake/Output last 3 shifts:  No intake/output data recorded.  Intake/Output this shift:  No intake/output data recorded.    PHYSICAL EXAM:    General: Alert, cooperative, no distress, appears stated age  Head:  Normocephalic, atraumatic, mucous membranes moist  Eyes:  Conjunctiva/corneas clear, EOM's intact     Neck:  Supple,  no bruit  Lungs: Clear to auscultation bilaterally, no wheezes rhonchi rales are noted  Chest wall: No tenderness  Heart::  Regular rate and rhythm, S1 and S2 normal, 1/6 holosystolic murmur.  No rub or gallop  Abdomen: Soft, non-tender, nondistended bowel sounds active  Extremities: No cyanosis, clubbing, or edema   Pulses: 2+ and symmetric all extremities  Skin:  No rashes or lesions  Neuro/psych: A&O x3. CN II through XII are grossly intact with appropriate affect      Scheduled Meds:      enalapril, 5 mg, Oral, Q24H  sodium chloride, 10 mL, Intravenous, Q12H        Continuous Infusions:         PRN Meds:    [COMPLETED] Insert peripheral IV **AND** sodium chloride  •  sodium chloride        Results Review:     I reviewed the patient's new clinical results.    CBC    Results from last 7 days   Lab Units 01/11/21  1409   WBC 10*3/mm3 7.40   HEMOGLOBIN g/dL 13.6   PLATELETS 10*3/mm3 243     Cr Clearance Estimated Creatinine Clearance: 88.4 mL/min (by C-G formula based on SCr of 0.72 mg/dL).  Coag   Results from last 7 days   Lab Units 01/11/21  1409   INR  1.06   APTT seconds 29.2     HbA1C No results found for: HGBA1C  Blood Glucose No results found for: POCGLU  Infection     CMP   Results from last 7 days   Lab Units 01/11/21  1409   SODIUM mmol/L 139   POTASSIUM mmol/L 4.2   CHLORIDE mmol/L 103   CO2 mmol/L 25.0   BUN mg/dL 18   CREATININE mg/dL 0.72   GLUCOSE mg/dL 95     ABG      UA      SOPHIA  No results found for: POCMETH, POCAMPHET, POCBARBITUR, POCBENZO, POCCOCAINE, POCOPIATES, POCOXYCODO, POCPHENCYC, POCPROPOXY, POCTHC, POCTRICYC  Lysis Labs   Results from last 7 days    Lab Units 01/11/21  1409   INR  1.06   APTT seconds 29.2   HEMOGLOBIN g/dL 13.6   PLATELETS 10*3/mm3 243   CREATININE mg/dL 0.72     Radiology(recent) Xr Chest 1 View    Result Date: 1/11/2021  No acute chest finding.  Electronically Signed By-Silvina Wing MD On:1/11/2021 2:02 PM This report was finalized on [id number] by  Silvina Wing MD.        Results from last 7 days   Lab Units 01/11/21  1925 01/11/21  1409   CK TOTAL U/L  --  73   TROPONIN T ng/mL <0.010 <0.010       Xrays, labs reviewed personally by physician.    ECG/EMG Results (most recent)     Procedure Component Value Units Date/Time    ECG 12 Lead [[id number]] Collected: 01/11/21 1345     Updated: 01/11/21 1348     QT Interval 436 ms     Narrative:      HEART RATE= 46  bpm  RR Interval= 1304  ms  WI Interval= 153  ms  P Horizontal Axis= 16  deg  P Front Axis= 35  deg  QRSD Interval= 86  ms  QT Interval= 436  ms  QRS Axis= 56  deg  T Wave Axis= 28  deg  - OTHERWISE NORMAL ECG -  Sinus bradycardia  Electronically Signed By:   Date and Time of Study: 2021-01-11 13:45:52    Transthoracic Echo Complete With Contrast if Necessary Per Protocol [[id number]] Collected: 01/11/21 1751     Updated: 01/11/21 2112     BSA 1.7 m^2      RVIDd 2.1 cm      IVSd 0.73 cm      LVIDd 5.0 cm      LVIDs 3.1 cm      LVPWd 1.1 cm      IVS/LVPW 0.65     FS 37.8 %      EDV(Teich) 118.9 ml      ESV(Teich) 38.5 ml      EF(Teich) 67.6 %      EDV(cubed) 125.9 ml      ESV(cubed) 30.3 ml      EF(cubed) 75.9 %      LV mass(C)d 164.8 grams      LV mass(C)dI 94.5 grams/m^2      SV(Teich) 80.4 ml      SI(Teich) 46.1 ml/m^2      SV(cubed) 95.5 ml      SI(cubed) 54.7 ml/m^2      Ao root diam 2.9 cm      Ao root area 6.8 cm^2      ACS 2.0 cm      asc Aorta Diam 2.6 cm      LVOT diam 1.9 cm      LVOT area 3.0 cm^2      RVOT diam 2.5 cm      RVOT area 5.1 cm^2      EDV(MOD-sp4) 47.9 ml      ESV(MOD-sp4) 16.9 ml      EF(MOD-sp4) 64.8 %      SV(MOD-sp4) 31.0 ml      SI(MOD-sp4) 17.8 ml/m^2       Ao root area (BSA corrected) 1.7     LV Sinclair Vol (BSA corrected) 27.4 ml/m^2      LV Sys Vol (BSA corrected) 9.7 ml/m^2      Aortic R-R 1.2 sec      Aortic HR 49.3 BPM      MV E max alesia 58.7 cm/sec      MV A max alesia 51.9 cm/sec      MV E/A 1.1     MV V2 max 69.6 cm/sec      MV max PG 1.9 mmHg      MV V2 mean 38.3 cm/sec      MV mean PG 0.67 mmHg      MV V2 VTI 28.6 cm      MVA(VTI) 2.4 cm^2      MV dec slope 156.5 cm/sec^2      MV dec time 0.37 sec      Ao pk alesia 142.1 cm/sec      Ao max PG 8.1 mmHg      Ao max PG (full) 3.4 mmHg      Ao V2 mean 84.1 cm/sec      Ao mean PG 3.4 mmHg      Ao mean PG (full) 1.6 mmHg      Ao V2 VTI 29.8 cm      RANDY(I,A) 2.3 cm^2      RANDY(I,D) 2.3 cm^2      RANDY(V,A) 2.3 cm^2      RANDY(V,D) 2.3 cm^2      LV V1 max PG 4.7 mmHg      LV V1 mean PG 1.9 mmHg      LV V1 max 108.7 cm/sec      LV V1 mean 60.3 cm/sec      LV V1 VTI 23.0 cm      CO(Ao) 10.0 l/min      CI(Ao) 5.7 l/min/m^2      SV(Ao) 203.4 ml      SI(Ao) 116.6 ml/m^2      CO(LVOT) 3.4 l/min      CI(LVOT) 1.9 l/min/m^2      SV(LVOT) 68.1 ml      SV(RVOT) 93.6 ml      SI(LVOT) 39.0 ml/m^2      PA V2 max 91.8 cm/sec      PA max PG 3.4 mmHg      PA max PG (full) 1.3 mmHg      PA V2 mean 64.0 cm/sec      PA mean PG 1.9 mmHg      PA mean PG (full) 0.82 mmHg      PA V2 VTI 23.9 cm      PVA(I,A) 3.9 cm^2      BH CV ECHO MAYA - PVA(I,D) 3.9 cm^2      BH CV ECHO MAYA - PVA(V,A) 4.0 cm^2       CV ECHO MAYA - PVA(V,D) 4.0 cm^2      PA acc time 0.15 sec      PI end-d alesia 99.4 cm/sec      PI max alesia 144.6 cm/sec      PI max PG 8.4 mmHg      RV V1 max PG 2.0 mmHg      RV V1 mean PG 1.0 mmHg      RV V1 max 71.3 cm/sec      RV V1 mean 46.9 cm/sec      RV V1 VTI 18.4 cm      TR max alesia 179.4 cm/sec      RVSP(TR) 15.9 mmHg      RAP systole 3.0 mmHg      PA pr(Accel) 12.2 mmHg      Qp/Qs 1.4      CV ECHO MAYA - BZI_BMI 27.8 kilograms/m^2       CV ECHO MAYA - BSA(HAYCOCK) 1.8 m^2       CV ECHO MAYA - BZI_METRIC_WEIGHT 71.2 kg       CV  "ECHO MAYA - BZI_METRIC_HEIGHT 160.0 cm      EF(MOD-bp) 65.0 %      LA dimension(2D) 3.3 cm     Narrative:      · Left ventricular ejection fraction appears to be 61 - 65%. Left   ventricular systolic function is normal.  · No significant Doppler abnormalities; RVSP 16 mmHg               Medication Review:   I have reviewed the patient's current medication list  Scheduled Meds:enalapril, 5 mg, Oral, Q24H  sodium chloride, 10 mL, Intravenous, Q12H      Continuous Infusions:   PRN Meds:.[COMPLETED] Insert peripheral IV **AND** sodium chloride  •  sodium chloride    Imaging:  Imaging Results (Last 72 Hours)     Procedure Component Value Units Date/Time    XR Chest 1 View [[id number]] Collected: 01/11/21 1402     Updated: 01/11/21 1404    Narrative:      DATE OF EXAM:  1/11/2021 1:58 PM     PROCEDURE:  XR CHEST 1 VW-     INDICATIONS:  chest pain       COMPARISON:  AP portable chest 2/16/2018     TECHNIQUE:   Single radiographic view of the chest was obtained.     FINDINGS:  Low volume inspiration. Heart size is normal. No acute airspace disease.  No pleural effusion or pneumothorax. No acute osseous abnormalities.       Impression:      No acute chest finding.     Electronically Signed By-Silvina Wing MD On:1/11/2021 2:02 PM  This report was finalized on [id number] by  Silvina Wing MD.            BOUBACAR Mcdaniel  01/12/21  11:40 EST       EMR Dragon/Transcription:   \"Dictated utilizing Dragon dictation\".   Disclaimer: Please note that areas of this note were completed with computer voice recognition software.  Quite often unanticipated grammatical, syntax, homophones, and other interpretive errors are inadvertently transcribed by the computer software. Please excuse any errors that have escaped final proofreading      Electronically signed by BOUBACAR Mcdaniel, 01/12/21, 11:40 AM EST.    Cardiology attending:  Seen and examined.  Chart and labs reviewed.  Patient seen with APC who scribed the note.  " Note has been reviewed and corrected for accuracy as above.  Patient denies chest pain.  Takes a significant amount of supplements.  Will hold supplements and evaluate rhythm.  Consider ischemic work-up when Covid negative.

## 2021-01-12 NOTE — PROGRESS NOTES
"      [facility] Daily Progress Note      Hospitalist Team  LOS 0 days      Patient Care Team:  Provider, No Known as PCP - General    Patient Location: 202/1      Subjective   Subjective     Chief Complaint / Subjective  Chief Complaint   Patient presents with   • Slow Heart Rate     dizzy, chest pain, sent in by [facility] states her heart rate there was 38         Brief Synopsis of Hospital Course/HPI  Ms. Matthew Marinelli is a 50 y.o.  hyspanic female with PMH significant for HTN,  presented with a slow heart rate.  Patient was at her primary doctor's office and had a heart rate in the 30s and they sent her to the emergency room.  Patient states she has been having episodes of chest pressure, shortness of breath, dizziness that are worse with exertion.  She denies any history of heart disease.  She denies any other alleviating or exacerbating factors.  She denies any chest pain currently.     Family history non contributory   No Tobaco or ETOH    Date:    1/12/21-her heart rate is 47, says feels fine, no new complaints, afebrile    Review of Systems   Constitution: Negative.   HENT: Negative.    Eyes: Negative.    Cardiovascular: Negative.    Respiratory: Negative.    Endocrine: Negative.    Hematologic/Lymphatic: Negative.    Skin: Negative.    Musculoskeletal: Negative.    Gastrointestinal: Negative.    Genitourinary: Negative.    Neurological: Negative.    Psychiatric/Behavioral: Negative.    Allergic/Immunologic: Negative.    All other systems reviewed and are negative.        Objective   Objective      Vital Signs  Temp:  [97.8 °F (36.6 °C)-98.1 °F (36.7 °C)] 98.1 °F (36.7 °C)  Heart Rate:  [41-61] 47  Resp:  [15-22] 15  BP: (103-152)/(53-95) 147/95  Oxygen Therapy  SpO2: 98 %  Pulse Oximetry Type: Continuous  Device (Oxygen Therapy): room air  Flowsheet Rows      First Filed Value   Admission Height  160 cm (63\") Documented at 01/11/2021 1328   Admission Weight  71.5 kg (157 " lb 10.1 oz) Documented at 01/11/2021 1328        Intake & Output (last 3 days)     None        Lines, Drains & Airways    Active LDAs     Name:   Placement date:   Placement time:   Site:   Days:    Peripheral IV 01/11/21 1409 Right Antecubital   01/11/21    1409    Antecubital   less than 1              Physical Exam  Vitals signs and nursing note reviewed.   Constitutional:       General: She is not in acute distress.     Appearance: Normal appearance. She is well-developed. She is not ill-appearing, toxic-appearing or diaphoretic.   HENT:      Head: Normocephalic and atraumatic.      Nose: Nose normal. No congestion or rhinorrhea.      Mouth/Throat:      Mouth: Mucous membranes are moist.      Pharynx: No oropharyngeal exudate.   Eyes:      General: No scleral icterus.        Right eye: No discharge.         Left eye: No discharge.      Extraocular Movements: Extraocular movements intact.      Conjunctiva/sclera: Conjunctivae normal.      Pupils: Pupils are equal, round, and reactive to light.   Neck:      Musculoskeletal: Normal range of motion and neck supple. No neck rigidity or muscular tenderness.      Thyroid: No thyromegaly.      Vascular: No carotid bruit or JVD.      Trachea: No tracheal deviation.   Cardiovascular:      Rate and Rhythm: Regular rhythm. Bradycardia present.      Pulses: Normal pulses.      Heart sounds: Normal heart sounds. No murmur. No friction rub. No gallop.    Pulmonary:      Effort: Pulmonary effort is normal. No respiratory distress.      Breath sounds: Normal breath sounds. No stridor. No wheezing, rhonchi or rales.   Chest:      Chest wall: No tenderness.   Abdominal:      General: Bowel sounds are normal. There is no distension.      Palpations: Abdomen is soft. There is no mass.      Tenderness: There is no abdominal tenderness. There is no guarding or rebound.      Hernia: No hernia is present.   Musculoskeletal: Normal range of motion.         General: No swelling,  tenderness, deformity or signs of injury.      Right lower leg: No edema.      Left lower leg: No edema.   Lymphadenopathy:      Cervical: No cervical adenopathy.   Skin:     General: Skin is warm and dry.      Coloration: Skin is not jaundiced or pale.      Findings: No bruising, erythema or rash.   Neurological:      General: No focal deficit present.      Mental Status: She is alert and oriented to person, place, and time. Mental status is at baseline.      Cranial Nerves: No cranial nerve deficit.      Sensory: No sensory deficit.      Motor: No weakness or abnormal muscle tone.      Coordination: Coordination normal.   Psychiatric:         Mood and Affect: Mood normal.         Behavior: Behavior normal.         Thought Content: Thought content normal.         Judgment: Judgment normal.         Procedures:    Results Review:     I reviewed the patient's new clinical results.    Lab Results (last 24 hours)     Procedure Component Value Units Date/Time    COVID-19,CEPHEID,COR/LAUREN/PAD IN-HOUSE(OR EMERGENT/ADD-ON),NP SWAB IN TRANSPORT MEDIA 3-4 HR TAT - Swab, Nasopharynx [[id number]]  (Abnormal) Collected: 01/12/21 0943    Specimen: Swab from Nasopharynx Updated: 01/12/21 1033     COVID19 Detected    Narrative:      Fact sheet for providers: https://www.fda.gov/media/605508/download     Fact sheet for patients: https://www.fda.gov/media/748546/download    Magnesium [[id number]]  (Normal) Collected: 01/12/21 0256    Specimen: Blood Updated: 01/12/21 0552     Magnesium 2.3 mg/dL     Phosphorus [[id number]]  (Normal) Collected: 01/12/21 0256    Specimen: Blood Updated: 01/12/21 0552     Phosphorus 3.8 mg/dL     Troponin [[id number]]  (Normal) Collected: 01/11/21 1925    Specimen: Blood Updated: 01/11/21 2055     Troponin T <0.010 ng/mL     Narrative:      Troponin T Reference Range:  <= 0.03 ng/mL-   Negative for AMI  >0.03 ng/mL-     Abnormal for myocardial necrosis.  Clinicians would have to utilize clinical acumen, EKG,  Troponin and serial changes to determine if it is an Acute Myocardial Infarction or myocardial injury due to an underlying chronic condition.       Results may be falsely decreased if patient taking Biotin.      Magnesium [[id number]]  (Normal) Collected: 01/11/21 1409    Specimen: Blood Updated: 01/11/21 1828     Magnesium 2.0 mg/dL     Phosphorus [[id number]]  (Normal) Collected: 01/11/21 1409    Specimen: Blood Updated: 01/11/21 1828     Phosphorus 4.0 mg/dL     Lipid Panel [[id number]] Collected: 01/11/21 1409    Specimen: Blood Updated: 01/11/21 1828     Total Cholesterol 127 mg/dL      Triglycerides 70 mg/dL      HDL Cholesterol 43 mg/dL      LDL Cholesterol  70 mg/dL      VLDL Cholesterol 14 mg/dL      LDL/HDL Ratio 1.63    Narrative:      Cholesterol Reference Ranges  (U.S. Department of Health and Human Services ATP III Classifications)    Desirable          <200 mg/dL  Borderline High    200-239 mg/dL  High Risk          >240 mg/dL      Triglyceride Reference Ranges  (U.S. Department of Health and Human Services ATP III Classifications)    Normal           <150 mg/dL  Borderline High  150-199 mg/dL  High             200-499 mg/dL  Very High        >500 mg/dL    HDL Reference Ranges  (U.S. Department of Health and Human Services ATP III Classifcations)    Low     <40 mg/dl (major risk factor for CHD)  High    >60 mg/dl ('negative' risk factor for CHD)        LDL Reference Ranges  (U.S. Department of Health and Human Services ATP III Classifcations)    Optimal          <100 mg/dL  Near Optimal     100-129 mg/dL  Borderline High  130-159 mg/dL  High             160-189 mg/dL  Very High        >189 mg/dL    CK [[id number]]  (Normal) Collected: 01/11/21 1409    Specimen: Blood Updated: 01/11/21 1828     Creatine Kinase 73 U/L     BNP [[id number]]  (Normal) Collected: 01/11/21 1409    Specimen: Blood Updated: 01/11/21 1822     proBNP 28.0 pg/mL     Narrative:      Among patients with dyspnea, NT-proBNP is highly  sensitive for the detection of acute congestive heart failure. In addition NT-proBNP of <300 pg/ml effectively rules out acute congestive heart failure with 99% negative predictive value.    Results may be falsely decreased if patient taking Biotin.      Basic Metabolic Panel [[id number]]  (Normal) Collected: 01/11/21 1409    Specimen: Blood Updated: 01/11/21 1435     Glucose 95 mg/dL      BUN 18 mg/dL      Creatinine 0.72 mg/dL      Sodium 139 mmol/L      Potassium 4.2 mmol/L      Chloride 103 mmol/L      CO2 25.0 mmol/L      Calcium 8.9 mg/dL      eGFR  African Amer 104 mL/min/1.73      eGFR Non African Amer 86 mL/min/1.73      BUN/Creatinine Ratio 25.0     Anion Gap 11.0 mmol/L     Narrative:      GFR Normal >60  Chronic Kidney Disease <60  Kidney Failure <15      Troponin [[id number]]  (Normal) Collected: 01/11/21 1409    Specimen: Blood Updated: 01/11/21 1435     Troponin T <0.010 ng/mL     Narrative:      Troponin T Reference Range:  <= 0.03 ng/mL-   Negative for AMI  >0.03 ng/mL-     Abnormal for myocardial necrosis.  Clinicians would have to utilize clinical acumen, EKG, Troponin and serial changes to determine if it is an Acute Myocardial Infarction or myocardial injury due to an underlying chronic condition.       Results may be falsely decreased if patient taking Biotin.      Protime-INR [[id number]]  (Normal) Collected: 01/11/21 1409    Specimen: Blood Updated: 01/11/21 1430     Protime 11.6 Seconds      INR 1.06    aPTT [[id number]]  (Normal) Collected: 01/11/21 1409    Specimen: Blood Updated: 01/11/21 1430     PTT 29.2 seconds     CBC & Differential [[id number]]  (Normal) Collected: 01/11/21 1409    Specimen: Blood Updated: 01/11/21 1418    Narrative:      The following orders were created for panel order CBC & Differential.  Procedure                               Abnormality         Status                     ---------                               -----------         ------                     CBC Auto  Differential[[id number]]        Normal              Final result                 Please view results for these tests on the individual orders.    CBC Auto Differential [[id number]]  (Normal) Collected: 01/11/21 1409    Specimen: Blood Updated: 01/11/21 1418     WBC 7.40 10*3/mm3      RBC 4.38 10*6/mm3      Hemoglobin 13.6 g/dL      Hematocrit 40.2 %      MCV 92.0 fL      MCH 31.1 pg      MCHC 33.8 g/dL      RDW 13.2 %      RDW-SD 42.4 fl      MPV 8.8 fL      Platelets 243 10*3/mm3      Neutrophil % 72.4 %      Lymphocyte % 20.4 %      Monocyte % 5.8 %      Eosinophil % 1.1 %      Basophil % 0.3 %      Neutrophils, Absolute 5.30 10*3/mm3      Lymphocytes, Absolute 1.50 10*3/mm3      Monocytes, Absolute 0.40 10*3/mm3      Eosinophils, Absolute 0.10 10*3/mm3      Basophils, Absolute 0.00 10*3/mm3      nRBC 0.1 /100 WBC         No results found for: HGBA1C  Results from last 7 days   Lab Units 01/11/21  1409   INR  1.06           No results found for: LIPASE  Lab Results   Component Value Date    CHOL 127 01/11/2021    TRIG 70 01/11/2021    HDL 43 01/11/2021    LDL 70 01/11/2021       No results found for: INTRAOP, PREDX, FINALDX, COMDX    Microbiology Results (last 10 days)     Procedure Component Value - Date/Time    COVID-19,CEPHEID,COR/LAUREN/PAD IN-HOUSE(OR EMERGENT/ADD-ON),NP SWAB IN TRANSPORT MEDIA 3-4 HR TAT - Swab, Nasopharynx [[id number]]  (Abnormal) Collected: 01/12/21 0943    Lab Status: Final result Specimen: Swab from Nasopharynx Updated: 01/12/21 1033     COVID19 Detected    Narrative:      Fact sheet for providers: https://www.fda.gov/media/313313/download     Fact sheet for patients: https://www.fda.gov/media/616733/download          ECG/EMG Results (most recent)     Procedure Component Value Units Date/Time    ECG 12 Lead [[id number]] Collected: 01/11/21 1345     Updated: 01/11/21 1348     QT Interval 436 ms     Narrative:      HEART RATE= 46  bpm  RR Interval= 1304  ms  KY Interval= 153  ms  P Horizontal  Axis= 16  deg  P Front Axis= 35  deg  QRSD Interval= 86  ms  QT Interval= 436  ms  QRS Axis= 56  deg  T Wave Axis= 28  deg  - OTHERWISE NORMAL ECG -  Sinus bradycardia  Electronically Signed By:   Date and Time of Study: 2021-01-11 13:45:52    Transthoracic Echo Complete With Contrast if Necessary Per Protocol [[id number]] Collected: 01/11/21 1751     Updated: 01/11/21 2112     BSA 1.7 m^2      RVIDd 2.1 cm      IVSd 0.73 cm      LVIDd 5.0 cm      LVIDs 3.1 cm      LVPWd 1.1 cm      IVS/LVPW 0.65     FS 37.8 %      EDV(Teich) 118.9 ml      ESV(Teich) 38.5 ml      EF(Teich) 67.6 %      EDV(cubed) 125.9 ml      ESV(cubed) 30.3 ml      EF(cubed) 75.9 %      LV mass(C)d 164.8 grams      LV mass(C)dI 94.5 grams/m^2      SV(Teich) 80.4 ml      SI(Teich) 46.1 ml/m^2      SV(cubed) 95.5 ml      SI(cubed) 54.7 ml/m^2      Ao root diam 2.9 cm      Ao root area 6.8 cm^2      ACS 2.0 cm      asc Aorta Diam 2.6 cm      LVOT diam 1.9 cm      LVOT area 3.0 cm^2      RVOT diam 2.5 cm      RVOT area 5.1 cm^2      EDV(MOD-sp4) 47.9 ml      ESV(MOD-sp4) 16.9 ml      EF(MOD-sp4) 64.8 %      SV(MOD-sp4) 31.0 ml      SI(MOD-sp4) 17.8 ml/m^2      Ao root area (BSA corrected) 1.7     LV Sinclair Vol (BSA corrected) 27.4 ml/m^2      LV Sys Vol (BSA corrected) 9.7 ml/m^2      Aortic R-R 1.2 sec      Aortic HR 49.3 BPM      MV E max alesia 58.7 cm/sec      MV A max alesia 51.9 cm/sec      MV E/A 1.1     MV V2 max 69.6 cm/sec      MV max PG 1.9 mmHg      MV V2 mean 38.3 cm/sec      MV mean PG 0.67 mmHg      MV V2 VTI 28.6 cm      MVA(VTI) 2.4 cm^2      MV dec slope 156.5 cm/sec^2      MV dec time 0.37 sec      Ao pk alesia 142.1 cm/sec      Ao max PG 8.1 mmHg      Ao max PG (full) 3.4 mmHg      Ao V2 mean 84.1 cm/sec      Ao mean PG 3.4 mmHg      Ao mean PG (full) 1.6 mmHg      Ao V2 VTI 29.8 cm      RANDY(I,A) 2.3 cm^2      RANDY(I,D) 2.3 cm^2      RANDY(V,A) 2.3 cm^2      RANDY(V,D) 2.3 cm^2      LV V1 max PG 4.7 mmHg      LV V1 mean PG 1.9 mmHg      LV V1 max  108.7 cm/sec      LV V1 mean 60.3 cm/sec      LV V1 VTI 23.0 cm      CO(Ao) 10.0 l/min      CI(Ao) 5.7 l/min/m^2      SV(Ao) 203.4 ml      SI(Ao) 116.6 ml/m^2      CO(LVOT) 3.4 l/min      CI(LVOT) 1.9 l/min/m^2      SV(LVOT) 68.1 ml      SV(RVOT) 93.6 ml      SI(LVOT) 39.0 ml/m^2      PA V2 max 91.8 cm/sec      PA max PG 3.4 mmHg      PA max PG (full) 1.3 mmHg      PA V2 mean 64.0 cm/sec      PA mean PG 1.9 mmHg      PA mean PG (full) 0.82 mmHg      PA V2 VTI 23.9 cm      PVA(I,A) 3.9 cm^2       CV ECHO MAYA - PVA(I,D) 3.9 cm^2       CV ECHO MAYA - PVA(V,A) 4.0 cm^2       CV ECHO MAYA - PVA(V,D) 4.0 cm^2      PA acc time 0.15 sec      PI end-d alesia 99.4 cm/sec      PI max alesia 144.6 cm/sec      PI max PG 8.4 mmHg      RV V1 max PG 2.0 mmHg      RV V1 mean PG 1.0 mmHg      RV V1 max 71.3 cm/sec      RV V1 mean 46.9 cm/sec      RV V1 VTI 18.4 cm      TR max alesia 179.4 cm/sec      RVSP(TR) 15.9 mmHg      RAP systole 3.0 mmHg      PA pr(Accel) 12.2 mmHg      Qp/Qs 1.4      CV ECHO MAYA - BZI_BMI 27.8 kilograms/m^2       CV ECHO MAYA - BSA(HAYCOCK) 1.8 m^2       CV ECHO MAYA - BZI_METRIC_WEIGHT 71.2 kg       CV ECHO MAYA - BZI_METRIC_HEIGHT 160.0 cm      EF(MOD-bp) 65.0 %      LA dimension(2D) 3.3 cm     Narrative:      · Left ventricular ejection fraction appears to be 61 - 65%. Left   ventricular systolic function is normal.  · No significant Doppler abnormalities; RVSP 16 mmHg                  Results for orders placed during the hospital encounter of 01/11/21   Transthoracic Echo Complete With Contrast if Necessary Per Protocol    Narrative · Left ventricular ejection fraction appears to be 61 - 65%. Left   ventricular systolic function is normal.  · No significant Doppler abnormalities; RVSP 16 mmHg          Xr Chest 1 View    Result Date: 1/11/2021  No acute chest finding.  Electronically Signed By-Silvina Wing MD On:1/11/2021 2:02 PM This report was finalized on [id number] by  Silvina Wing,  MD.          Xrays, labs reviewed personally by physician.    Medication Review:   I have reviewed the patient's current medication list      Scheduled Meds  enalapril, 5 mg, Oral, Q24H  sodium chloride, 10 mL, Intravenous, Q12H        Meds Infusions       Meds PRN  [COMPLETED] Insert peripheral IV **AND** sodium chloride  •  sodium chloride        Assessment/Plan   Assessment/Plan     Active Hospital Problems    Diagnosis  POA   • Chest pain [R07.9]  Yes      Resolved Hospital Problems   No resolved problems to display.       MEDICAL DECISION MAKING COMPLEXITY BY PROBLEM:   Bradycardia-  Serial enzymes neg  Stress test-P  Holter monitor   Cardiology consulted  Echocardiogram     Hypertension -continue home meds    Had Covid sept/22/2020-retest positive-consulted ID,      Plan-  Awaiting stress test, monitor HR/BP  ID consulted regarding Isolation>stress test     VTE Prophylaxis -   Mechanical Order History:      Ordered        01/11/21 1737  Place Sequential Compression Device  Once         01/11/21 1737  Maintain Sequential Compression Device  Continuous                 Pharmalogical Order History:     None            Code Status -   Code Status and Medical Interventions:   Ordered at: 01/11/21 1652     Level Of Support Discussed With:    Patient     Code Status:    CPR     Medical Interventions (Level of Support Prior to Arrest):    Full       This patient has been examined wearing appropriate Personal Protective Equipment and discussed with rn. 01/12/21        Discharge Planning  home        Electronically signed by Edilberto Caputo MD, 01/12/21, 11:35 EST.  [facility] Hospitalist Team

## 2021-01-12 NOTE — PROGRESS NOTES
Continued Stay Note  MAXINE Wong     Patient Name: Julisa Marinelli  MRN: [id number]  Today's Date: 1/12/2021    Admit Date: 1/11/2021    Discharge Plan     Row Name 01/12/21 1407       Plan    Plan Comments  SW spoke with pt daughter Irene by phone on this day. She reported that the patient is current with [facility] and will follow up once she discharges.        Discharge Codes    No documentation.       Sandi CHRISTIANSON   Cell: [phone]  Office: [phone]  Fax: [phone]

## 2021-01-12 NOTE — PLAN OF CARE
Goal Outcome Evaluation:  Plan of Care Reviewed With: patient     Outcome Summary: Pt resting on and off through the night. Pt voices no complaints of chest pain or dizziness this shift. Pt bradycardic and is most often in the 40's and occasionally will dip down to 38-39. Pt is NPO and awiting stress test today. Will continue to monitor.

## 2021-01-13 LAB
MAGNESIUM SERPL-MCNC: 2.1 MG/DL (ref 1.6–2.6)
PHOSPHATE SERPL-MCNC: 3.8 MG/DL (ref 2.5–4.5)
T3FREE SERPL-MCNC: 2.65 PG/ML (ref 2–4.4)
T4 FREE SERPL-MCNC: 1.37 NG/DL (ref 0.93–1.7)
TSH SERPL DL<=0.05 MIU/L-ACNC: 1.53 UIU/ML (ref 0.27–4.2)

## 2021-01-13 PROCEDURE — G0378 HOSPITAL OBSERVATION PER HR: HCPCS

## 2021-01-13 PROCEDURE — 84481 FREE ASSAY (FT-3): CPT | Performed by: INTERNAL MEDICINE

## 2021-01-13 PROCEDURE — 99214 OFFICE O/P EST MOD 30 MIN: CPT | Performed by: INTERNAL MEDICINE

## 2021-01-13 PROCEDURE — 83735 ASSAY OF MAGNESIUM: CPT | Performed by: INTERNAL MEDICINE

## 2021-01-13 PROCEDURE — 99225 PR SBSQ OBSERVATION CARE/DAY 25 MINUTES: CPT | Performed by: INTERNAL MEDICINE

## 2021-01-13 PROCEDURE — 84439 ASSAY OF FREE THYROXINE: CPT | Performed by: INTERNAL MEDICINE

## 2021-01-13 PROCEDURE — 84100 ASSAY OF PHOSPHORUS: CPT | Performed by: INTERNAL MEDICINE

## 2021-01-13 PROCEDURE — 84443 ASSAY THYROID STIM HORMONE: CPT | Performed by: INTERNAL MEDICINE

## 2021-01-13 RX ORDER — ZINC SULFATE 50(220)MG
220 CAPSULE ORAL DAILY
Status: DISCONTINUED | OUTPATIENT
Start: 2021-01-13 | End: 2021-01-14 | Stop reason: HOSPADM

## 2021-01-13 RX ORDER — ASCORBIC ACID 500 MG
1000 TABLET ORAL DAILY
Status: DISCONTINUED | OUTPATIENT
Start: 2021-01-13 | End: 2021-01-14 | Stop reason: HOSPADM

## 2021-01-13 RX ADMIN — Medication 10 ML: at 21:04

## 2021-01-13 RX ADMIN — Medication 10 ML: at 10:52

## 2021-01-13 RX ADMIN — ENALAPRIL MALEATE 5 MG: 5 TABLET ORAL at 10:51

## 2021-01-13 NOTE — PROGRESS NOTES
Cardiology Progress Note      Admiting Physician:  Jennie Loya MD   LOS: 0 days       Reason For Followup:  Bradycardia      Subjective:    Interval History:  Chart and labs reviewed.  Case discussed with nursing.  Patient apparently has a reinfection with Covid.  It seems that the patient had a positive Covid diagnosis in September 2020.  We are currently trying to locate the positive test result from the [facility].    Review of Systems:  Review of systems unobtained due to Covid isolation    Assessment & Plan    Impressions:  Bradycardia possibly related to multiple dietary supplements  History of hypertension  Atypical chest discomfort  Covid 19 virus detected     Possible early reinfection    Recommendations:  Continue to hold supplements  Ideally would consider ischemic work-up and stress testing for chronotropic incompetence however this is precluded by COVID-19 diagnosis.  Can consider ischemic work-up and chronotropic incompetence testing when virus has cleared.  Await results of [facility] records  Further recommendations as patient's course progresses.      Objective:    Medication Review:   Scheduled Meds:ascorbic acid, 1,000 mg, Oral, Daily  enalapril, 5 mg, Oral, Q24H  sodium chloride, 10 mL, Intravenous, Q12H  zinc sulfate, 220 mg, Oral, Daily      Continuous Infusions:   PRN Meds:.[COMPLETED] Insert peripheral IV **AND** sodium chloride  •  sodium chloride    Patient Active Problem List   Diagnosis   • Chest pain         Physical Exam:    Physical examination deferred due to Covid isolation and reducing risk exposure.  Examination from admitting service as well as nursing assessments utilized for medical decision-making.    Vital Signs:  Vitals:    01/13/21 0317 01/13/21 0835 01/13/21 1120 01/13/21 1606   BP: 131/81 154/71 130/83 131/81   BP Location: Left arm      Patient Position: Lying      Pulse: (!) 49 56 56 56   Resp: 18 18 17 17   Temp: 98 °F  (36.7 °C) 98.1 °F (36.7 °C) 98.5 °F (36.9 °C) 98.5 °F (36.9 °C)   TempSrc: Oral Oral Oral Oral   SpO2: 96% 95% 95% 96%   Weight:       Height:         Wt Readings from Last 1 Encounters:   01/11/21 71.2 kg (157 lb)       Intake/Output Summary (Last 24 hours) at 1/13/2021 1857  Last data filed at 1/13/2021 1401  Gross per 24 hour   Intake 480 ml   Output --   Net 480 ml         Results Review:     CBC    Results from last 7 days   Lab Units 01/11/21  1409   WBC 10*3/mm3 7.40   HEMOGLOBIN g/dL 13.6   PLATELETS 10*3/mm3 243     Cr Clearance Estimated Creatinine Clearance: 88.4 mL/min (by C-G formula based on SCr of 0.72 mg/dL).  Coag   Results from last 7 days   Lab Units 01/11/21  1409   INR  1.06   APTT seconds 29.2     HbA1C No results found for: HGBA1C  Blood Glucose No results found for: POCGLU  Infection     CMP   Results from last 7 days   Lab Units 01/11/21  1409   SODIUM mmol/L 139   POTASSIUM mmol/L 4.2   CHLORIDE mmol/L 103   CO2 mmol/L 25.0   BUN mg/dL 18   CREATININE mg/dL 0.72   GLUCOSE mg/dL 95     ABG      UA      SOPHIA  No results found for: POCMETH, POCAMPHET, POCBARBITUR, POCBENZO, POCCOCAINE, POCOPIATES, POCOXYCODO, POCPHENCYC, POCPROPOXY, POCTHC, POCTRICYC  Lysis Labs   Results from last 7 days   Lab Units 01/11/21  1409   INR  1.06   APTT seconds 29.2   HEMOGLOBIN g/dL 13.6   PLATELETS 10*3/mm3 243   CREATININE mg/dL 0.72     Radiology(recent) No radiology results for the last day      Results from last 7 days   Lab Units 01/11/21  1925 01/11/21  1409   CK TOTAL U/L  --  73   TROPONIN T ng/mL <0.010 <0.010       Imaging Results (Last 24 Hours)     ** No results found for the last 24 hours. **          Cardiac Studies:  Echo-   Results for orders placed during the hospital encounter of 01/11/21   Transthoracic Echo Complete With Contrast if Necessary Per Protocol    Narrative · Left ventricular ejection fraction appears to be 61 - 65%. Left   ventricular systolic function is normal.  · No significant  Doppler abnormalities; RVSP 16 mmHg        Stress Myoview-  Cath-        Gucci Giron DO  01/13/21  18:57 EST

## 2021-01-13 NOTE — PROGRESS NOTES
"      [facility] Medicine Services Daily Progress Note      Hospitalist Team  LOS 0 days      Patient Care Team:  Provider, No Known as PCP - General    Patient Location: 312/1      Subjective   Subjective     Chief Complaint / Subjective  Chief Complaint   Patient presents with   • Slow Heart Rate     dizzy, chest pain, sent in by [facility] states her heart rate there was 38         Brief Synopsis of Hospital Course/HPI    Ms. Matthew Marinelli is a 50 y.o.  hyspanic female with PMH significant for HTN,  presented with a slow heart rate.  Patient was at her primary doctor's office and had a heart rate in the 30s and they sent her to the emergency room.  Patient states she has been having episodes of chest pressure, shortness of breath, dizziness that are worse with exertion.  She denies any history of heart disease.  She denies any other alleviating or exacerbating factors.  She denies any chest pain currently.      Date::  1/13/20   patient seen today she denies any chest pains no shortness of breath. She remains bradycardic, heart rate dropped to 41 overnight blood pressure remains stable    Review of Systems   Constitution: Negative.   HENT: Negative.    Cardiovascular: Negative.    Respiratory: Negative.    Skin: Negative.    Gastrointestinal: Negative.    Neurological: Negative.          Objective   Objective      Vital Signs  Temp:  [98 °F (36.7 °C)-98.5 °F (36.9 °C)] 98.5 °F (36.9 °C)  Heart Rate:  [42-56] 56  Resp:  [17-18] 17  BP: (130-159)/(71-83) 130/83  Oxygen Therapy  SpO2: 95 %  Pulse Oximetry Type: Continuous  Device (Oxygen Therapy): room air  Flowsheet Rows      First Filed Value   Admission Height  160 cm (63\") Documented at 01/11/2021 1328   Admission Weight  71.5 kg (157 lb 10.1 oz) Documented at 01/11/2021 1328        Intake & Output (last 3 days)       01/10 0701 - 01/11 0700 01/11 0701 - 01/12 0700 01/12 0701 - 01/13 0700 01/13 0701 - 01/14 0700    P.O.    240    Total " Intake(mL/kg)    240 (3.4)    Net    +240                Lines, Drains & Airways    Active LDAs     Name:   Placement date:   Placement time:   Site:   Days:    Peripheral IV 01/11/21 1409 Right Antecubital   01/11/21 1409    Antecubital   1                  Physical Exam:    Physical Exam  Vitals signs reviewed.   Constitutional:       Appearance: Normal appearance.   HENT:      Head: Normocephalic and atraumatic.      Mouth/Throat:      Mouth: Mucous membranes are moist.   Eyes:      Pupils: Pupils are equal, round, and reactive to light.   Neck:      Musculoskeletal: Normal range of motion and neck supple.   Cardiovascular:      Rate and Rhythm: Regular rhythm. Bradycardia present.      Heart sounds: Normal heart sounds.   Pulmonary:      Effort: Pulmonary effort is normal.      Breath sounds: Normal breath sounds.   Abdominal:      General: Abdomen is flat. Bowel sounds are normal.      Palpations: Abdomen is soft.   Skin:     General: Skin is warm and dry.      Capillary Refill: Capillary refill takes less than 2 seconds.   Neurological:      General: No focal deficit present.      Mental Status: She is alert and oriented to person, place, and time.             Results Review:     I reviewed the patient's new clinical results.      Lab Results (last 24 hours)     Procedure Component Value Units Date/Time    T3, Free [[id number]]  (Normal) Collected: 01/13/21 0808    Specimen: Blood Updated: 01/13/21 1121     T3, Free 2.65 pg/mL     Narrative:      Results may be falsely increased if patient taking Biotin.      T4, Free [[id number]]  (Normal) Collected: 01/13/21 0808    Specimen: Blood Updated: 01/13/21 0958     Free T4 1.37 ng/dL     Narrative:      Results may be falsely increased if patient taking Biotin.      TSH [[id number]]  (Normal) Collected: 01/13/21 0808    Specimen: Blood Updated: 01/13/21 0853     TSH 1.530 uIU/mL     Magnesium [[id number]]  (Normal) Collected: 01/13/21 0231    Specimen: Blood  Updated: 01/13/21 0334     Magnesium 2.1 mg/dL     Phosphorus [[id number]]  (Normal) Collected: 01/13/21 0231    Specimen: Blood Updated: 01/13/21 0334     Phosphorus 3.8 mg/dL         No results found for: HGBA1C  Results from last 7 days   Lab Units 01/11/21  1409   INR  1.06           No results found for: LIPASE  Lab Results   Component Value Date    CHOL 127 01/11/2021    TRIG 70 01/11/2021    HDL 43 01/11/2021    LDL 70 01/11/2021       No results found for: INTRAOP, PREDX, FINALDX, COMDX    Microbiology Results (last 10 days)     Procedure Component Value - Date/Time    COVID-19,CEPHEID,COR/LAUREN/PAD IN-HOUSE(OR EMERGENT/ADD-ON),NP SWAB IN TRANSPORT MEDIA 3-4 HR TAT - Swab, Nasopharynx [[id number]]  (Abnormal) Collected: 01/12/21 0943    Lab Status: Final result Specimen: Swab from Nasopharynx Updated: 01/12/21 1033     COVID19 Detected    Narrative:      Fact sheet for providers: https://www.fda.gov/media/952733/download     Fact sheet for patients: https://www.fda.gov/media/422323/download          ECG/EMG Results (most recent)     Procedure Component Value Units Date/Time    ECG 12 Lead [[id number]] Collected: 01/11/21 1345     Updated: 01/11/21 1348     QT Interval 436 ms     Narrative:      HEART RATE= 46  bpm  RR Interval= 1304  ms  WY Interval= 153  ms  P Horizontal Axis= 16  deg  P Front Axis= 35  deg  QRSD Interval= 86  ms  QT Interval= 436  ms  QRS Axis= 56  deg  T Wave Axis= 28  deg  - OTHERWISE NORMAL ECG -  Sinus bradycardia  Electronically Signed By:   Date and Time of Study: 2021-01-11 13:45:52    Transthoracic Echo Complete With Contrast if Necessary Per Protocol [[id number]] Collected: 01/11/21 1751     Updated: 01/11/21 2112     BSA 1.7 m^2      RVIDd 2.1 cm      IVSd 0.73 cm      LVIDd 5.0 cm      LVIDs 3.1 cm      LVPWd 1.1 cm      IVS/LVPW 0.65     FS 37.8 %      EDV(Teich) 118.9 ml      ESV(Teich) 38.5 ml      EF(Teich) 67.6 %      EDV(cubed) 125.9 ml      ESV(cubed) 30.3 ml      EF(cubed)  75.9 %      LV mass(C)d 164.8 grams      LV mass(C)dI 94.5 grams/m^2      SV(Teich) 80.4 ml      SI(Teich) 46.1 ml/m^2      SV(cubed) 95.5 ml      SI(cubed) 54.7 ml/m^2      Ao root diam 2.9 cm      Ao root area 6.8 cm^2      ACS 2.0 cm      asc Aorta Diam 2.6 cm      LVOT diam 1.9 cm      LVOT area 3.0 cm^2      RVOT diam 2.5 cm      RVOT area 5.1 cm^2      EDV(MOD-sp4) 47.9 ml      ESV(MOD-sp4) 16.9 ml      EF(MOD-sp4) 64.8 %      SV(MOD-sp4) 31.0 ml      SI(MOD-sp4) 17.8 ml/m^2      Ao root area (BSA corrected) 1.7     LV Sinclair Vol (BSA corrected) 27.4 ml/m^2      LV Sys Vol (BSA corrected) 9.7 ml/m^2      Aortic R-R 1.2 sec      Aortic HR 49.3 BPM      MV E max alesia 58.7 cm/sec      MV A max alesia 51.9 cm/sec      MV E/A 1.1     MV V2 max 69.6 cm/sec      MV max PG 1.9 mmHg      MV V2 mean 38.3 cm/sec      MV mean PG 0.67 mmHg      MV V2 VTI 28.6 cm      MVA(VTI) 2.4 cm^2      MV dec slope 156.5 cm/sec^2      MV dec time 0.37 sec      Ao pk alesia 142.1 cm/sec      Ao max PG 8.1 mmHg      Ao max PG (full) 3.4 mmHg      Ao V2 mean 84.1 cm/sec      Ao mean PG 3.4 mmHg      Ao mean PG (full) 1.6 mmHg      Ao V2 VTI 29.8 cm      RANDY(I,A) 2.3 cm^2      RANDY(I,D) 2.3 cm^2      RANDY(V,A) 2.3 cm^2      RANDY(V,D) 2.3 cm^2      LV V1 max PG 4.7 mmHg      LV V1 mean PG 1.9 mmHg      LV V1 max 108.7 cm/sec      LV V1 mean 60.3 cm/sec      LV V1 VTI 23.0 cm      CO(Ao) 10.0 l/min      CI(Ao) 5.7 l/min/m^2      SV(Ao) 203.4 ml      SI(Ao) 116.6 ml/m^2      CO(LVOT) 3.4 l/min      CI(LVOT) 1.9 l/min/m^2      SV(LVOT) 68.1 ml      SV(RVOT) 93.6 ml      SI(LVOT) 39.0 ml/m^2      PA V2 max 91.8 cm/sec      PA max PG 3.4 mmHg      PA max PG (full) 1.3 mmHg      PA V2 mean 64.0 cm/sec      PA mean PG 1.9 mmHg      PA mean PG (full) 0.82 mmHg      PA V2 VTI 23.9 cm      PVA(I,A) 3.9 cm^2      BH CV ECHO MAYA - PVA(I,D) 3.9 cm^2      BH CV ECHO MAYA - PVA(V,A) 4.0 cm^2      BH CV ECHO MAYA - PVA(V,D) 4.0 cm^2      PA acc time 0.15 sec      PI  end-d alesia 99.4 cm/sec      PI max alesia 144.6 cm/sec      PI max PG 8.4 mmHg      RV V1 max PG 2.0 mmHg      RV V1 mean PG 1.0 mmHg      RV V1 max 71.3 cm/sec      RV V1 mean 46.9 cm/sec      RV V1 VTI 18.4 cm      TR max alesia 179.4 cm/sec      RVSP(TR) 15.9 mmHg      RAP systole 3.0 mmHg      PA pr(Accel) 12.2 mmHg      Qp/Qs 1.4      CV ECHO MAYA - BZI_BMI 27.8 kilograms/m^2       CV ECHO MAYA - BSA(HAYCOCK) 1.8 m^2       CV ECHO MAYA - BZI_METRIC_WEIGHT 71.2 kg       CV ECHO MAYA - BZI_METRIC_HEIGHT 160.0 cm      EF(MOD-bp) 65.0 %      LA dimension(2D) 3.3 cm     Narrative:      · Left ventricular ejection fraction appears to be 61 - 65%. Left   ventricular systolic function is normal.  · No significant Doppler abnormalities; RVSP 16 mmHg                  Results for orders placed during the hospital encounter of 01/11/21   Transthoracic Echo Complete With Contrast if Necessary Per Protocol    Narrative · Left ventricular ejection fraction appears to be 61 - 65%. Left   ventricular systolic function is normal.  · No significant Doppler abnormalities; RVSP 16 mmHg          Xr Chest 1 View    Result Date: 1/11/2021  No acute chest finding.  Electronically Signed By-Silvina Wing MD On:1/11/2021 2:02 PM This report was finalized on [id number] by  Silvina Wing MD.          Xrays, labs reviewed personally by physician.    Medication Review:   I have reviewed the patient's current medication list      Scheduled Meds  enalapril, 5 mg, Oral, Q24H  sodium chloride, 10 mL, Intravenous, Q12H        Meds Infusions       Meds PRN  [COMPLETED] Insert peripheral IV **AND** sodium chloride  •  sodium chloride        Assessment/Plan   Assessment/Plan     Active Hospital Problems    Diagnosis  POA   • Chest pain [R07.9]  Yes      Resolved Hospital Problems   No resolved problems to display.       MEDICAL DECISION MAKING COMPLEXITY BY PROBLEM:     Female patient was diagnosed with COVID-19 on 22 September underwent  routine checkup as an outpatient and noted to be bradycardic for which she was referred for further evaluation.    Assessment    Sinus bradycardia-asymptomatic  TSH T3-T4 within normal limits  Will obtain echo  cardiology consult  this can be related to Covid-  19  Infection     Essential hypertension  clinically stable  we will continue to monitor    history of COVID-19 infection  patient remains asymptomatic except significant bradycardia  repeat routine screening on admission is positive  suspect new infection  no need for steroids at this time since she is not hypoxic  no indication for remdesivir  we will continue to monitor the patient for next 24 hours  ID recommend 10 days of isolation  enhanced Covid precautions as per CDC guidelines  start zinc and vitamin C    Lovenox subcu for DVT prophylaxis    time spent with patient and family 40 minutes      VTE Prophylaxis -   Mechanical Order History:      Ordered        01/11/21 1737  Place Sequential Compression Device  Once         01/11/21 1737  Maintain Sequential Compression Device  Continuous                 Pharmalogical Order History:     None            Code Status -   Code Status and Medical Interventions:   Ordered at: 01/11/21 1652     Level Of Support Discussed With:    Patient     Code Status:    CPR     Medical Interventions (Level of Support Prior to Arrest):    Full       This patient has been examined wearing appropriate Personal Protective Equipment     Discharge Planning  Home        Electronically signed by Jennie Loya MD, 01/13/21, 13:50 EST.  [facility] Hospitalist Team

## 2021-01-13 NOTE — PROGRESS NOTES
Continued Stay Note  MAXINE Wong     Patient Name: Julisa Marinelli  MRN: [id number]  Today's Date: 1/13/2021    Admit Date: 1/11/2021    Discharge Plan     Row Name 01/13/21 1509       Plan    Plan  Anticipate routine home. SW notified of uninsured.    Plan Comments  DC barriers: cardiology following            Rose Kwok RN

## 2021-01-13 NOTE — PLAN OF CARE
Goal Outcome Evaluation:  Plan of Care Reviewed With: patient  Progress: no change   Patient rested comfortably in bed throughout the night without complaints of pain.  HR dropped in the 20's per monitor tech.  No other concerns at this time, will continue to monitor.

## 2021-01-13 NOTE — PLAN OF CARE
Goal Outcome Evaluation:  Plan of Care Reviewed With: patient  Progress: no change  Outcome Summary: Pt has rested comfortably throughout shift. HR still maintaining between 45-55 bpm. Pt still asymptomatic of covid. Will continue to monitor.

## 2021-01-14 ENCOUNTER — READMISSION MANAGEMENT (OUTPATIENT)
Dept: CALL CENTER | Facility: HOSPITAL | Age: 51
End: 2021-01-14

## 2021-01-14 VITALS
DIASTOLIC BLOOD PRESSURE: 80 MMHG | WEIGHT: 152.12 LBS | HEART RATE: 49 BPM | TEMPERATURE: 97.6 F | SYSTOLIC BLOOD PRESSURE: 131 MMHG | BODY MASS INDEX: 26.95 KG/M2 | OXYGEN SATURATION: 95 % | RESPIRATION RATE: 16 BRPM | HEIGHT: 63 IN

## 2021-01-14 LAB
MAGNESIUM SERPL-MCNC: 2.1 MG/DL (ref 1.6–2.6)
PHOSPHATE SERPL-MCNC: 3.9 MG/DL (ref 2.5–4.5)

## 2021-01-14 PROCEDURE — 84100 ASSAY OF PHOSPHORUS: CPT | Performed by: INTERNAL MEDICINE

## 2021-01-14 PROCEDURE — 83735 ASSAY OF MAGNESIUM: CPT | Performed by: INTERNAL MEDICINE

## 2021-01-14 PROCEDURE — G0378 HOSPITAL OBSERVATION PER HR: HCPCS

## 2021-01-14 PROCEDURE — 99217 PR OBSERVATION CARE DISCHARGE MANAGEMENT: CPT | Performed by: INTERNAL MEDICINE

## 2021-01-14 RX ADMIN — ENALAPRIL MALEATE 5 MG: 5 TABLET ORAL at 10:08

## 2021-01-14 NOTE — DISCHARGE SUMMARY
[facility] Medicine Services  DISCHARGE SUMMARY        Prepared For PCP:  Provider, No Known    Patient Name: Julisa Marinelli  : 1970  MRN: [id number]      Date of Admission:   2021    Date of Discharge:  2021    Length of stay:  LOS: 0 days     Hospital Course     Presenting Problem:   Sinus bradycardia [R00.1]  Chest pain, unspecified type [R07.9]      Active Hospital Problems    Diagnosis  POA   • Chest pain [R07.9]  Yes      Resolved Hospital Problems   No resolved problems to display.           Hospital Course:  Julisa Marinelli is a 50 y.o. female  with COVID-19 on  underwent routine checkup as an outpatient and noted to be bradycardic for which she was referred for further evaluation.     She was admitted for asymptomatic sinus bradycardia  Thyroid function within normal limits  Blood pressure is well controlled  Echo unremarkable  Patient will be discharged home etiology possibly related to COVID-19 virus.  She is on supplements at home she was not able to give us the name of the supplements I have asked her to hold back on the supplements and follow-up with cardiology as outpatient.  Have also asked her to report to the hospital emergency room if she experiences any dizziness any palpitations or she becomes symptomatic.  Her daughter was present I spoke to her in Moroc[place] and she verbalized understanding.             Day of Discharge     HPI:   Female patient was diagnosed with COVID-19 on  underwent routine checkup as an outpatient and noted to be bradycardic for which she was referred for further evaluation.         Vital Signs:   Temp:  [97.6 °F (36.4 °C)-99.1 °F (37.3 °C)] 97.6 °F (36.4 °C)  Heart Rate:  [45-61] 49  Resp:  [16-18] 16  BP: (130-139)/(56-83) 131/80     Physical Exam:  Physical Exam  Vitals signs reviewed.   Constitutional:       Appearance: Normal appearance.   HENT:      Head: Normocephalic and atraumatic.       Nose: Nose normal.      Mouth/Throat:      Mouth: Mucous membranes are moist.   Eyes:      Pupils: Pupils are equal, round, and reactive to light.   Neck:      Musculoskeletal: Normal range of motion and neck supple.   Cardiovascular:      Rate and Rhythm: Normal rate and regular rhythm.      Heart sounds: Normal heart sounds.   Pulmonary:      Effort: Pulmonary effort is normal.      Breath sounds: Normal breath sounds.   Abdominal:      General: Abdomen is flat. Bowel sounds are normal.      Palpations: Abdomen is soft.   Skin:     General: Skin is warm and dry.      Capillary Refill: Capillary refill takes less than 2 seconds.   Neurological:      General: No focal deficit present.      Mental Status: She is alert and oriented to person, place, and time.         Pertinent  and/or Most Recent Results     Results from last 7 days   Lab Units 01/11/21  1409   WBC 10*3/mm3 7.40   HEMOGLOBIN g/dL 13.6   HEMATOCRIT % 40.2   PLATELETS 10*3/mm3 243   SODIUM mmol/L 139   POTASSIUM mmol/L 4.2   CHLORIDE mmol/L 103   CO2 mmol/L 25.0   BUN mg/dL 18   CREATININE mg/dL 0.72   GLUCOSE mg/dL 95   CALCIUM mg/dL 8.9     Results from last 7 days   Lab Units 01/11/21  1409   PROTIME Seconds 11.6   INR  1.06   APTT seconds 29.2     Results from last 7 days   Lab Units 01/11/21  1409   CHOLESTEROL mg/dL 127   TRIGLYCERIDES mg/dL 70   HDL CHOL mg/dL 43     Results from last 7 days   Lab Units 01/13/21  0808 01/11/21  1925 01/11/21  1409   TSH uIU/mL 1.530  --   --    PROBNP pg/mL  --   --  28.0   TROPONIN T ng/mL  --  <0.010 <0.010       Brief Urine Lab Results  (Last result in the past 365 days)      Color   Clarity   Blood   Leuk Est   Nitrite   Protein   CREAT   Urine HCG        11/29/20 0034 Yellow Clear Negative Negative Negative Negative               Microbiology Results Abnormal     Procedure Component Value - Date/Time    COVID-19,CEPHEID,COR/LAUREN/PAD IN-HOUSE(OR EMERGENT/ADD-ON),NP SWAB IN TRANSPORT MEDIA 3-4 HR TAT - Swab,  Nasopharynx [[id number]]  (Abnormal) Collected: 01/12/21 0943    Lab Status: Final result Specimen: Swab from Nasopharynx Updated: 01/12/21 1033     COVID19 Detected    Narrative:      Fact sheet for providers: https://www.fda.gov/media/464427/download     Fact sheet for patients: https://www.fda.gov/media/970721/download          Xr Chest 1 View    Result Date: 1/11/2021  Impression: No acute chest finding.  Electronically Signed By-Silvina Wing MD On:1/11/2021 2:02 PM This report was finalized on [id number] by  Silvina Wing MD.                Results for orders placed during the hospital encounter of 01/11/21   Transthoracic Echo Complete With Contrast if Necessary Per Protocol    Narrative · Left ventricular ejection fraction appears to be 61 - 65%. Left   ventricular systolic function is normal.  · No significant Doppler abnormalities; RVSP 16 mmHg                  Test Results Pending at Discharge        Procedures Performed           Consults:   Consults     Date and Time Order Name Status Description    1/13/2021 0759 Inpatient Cardiology Consult      1/12/2021 1231 Inpatient Infectious Diseases Consult      1/11/2021 1654 Inpatient Cardiology Consult Completed     1/11/2021 1509 Hospitalist (on-call MD unless specified) Completed             Discharge Details        Discharge Medications      Continue These Medications      Instructions Start Date   enalapril 10 MG tablet  Commonly known as: VASOTEC   10 mg, Oral, Daily         Stop These Medications    NON FORMULARY            Allergies   Allergen Reactions   • Penicillins Unknown - Low Severity         Discharge Disposition:  Home or Self Care    Diet:  Hospital:  Diet Order   Procedures   • Diet Regular         Discharge Activity:         CODE STATUS:    Code Status and Medical Interventions:   Ordered at: 01/11/21 1652     Level Of Support Discussed With:    Patient     Code Status:    CPR     Medical Interventions (Level of Support Prior to  Arrest):    Full         Follow-up Appointments  No future appointments.    Additional Instructions for the Follow-ups that You Need to Schedule     Discharge Follow-up with PCP   As directed       Currently Documented PCP:    Provider, No Known    PCP Phone Number:    None     Follow Up Details: routine                 Condition on Discharge:      Stable      This patient has been examined wearing appropriate Personal Protective Equipment     Electronically signed by Jennie Loya MD, 01/14/21, 11:14 AM EST.      Time: I spent  35  minutes on this discharge activity which included face-to-face encounter with the patient/reviewing the data in the system/coordination of the care with the nursing staff as well as consultants/documentation/entering orders.

## 2021-01-14 NOTE — PLAN OF CARE
Goal Outcome Evaluation:  Plan of Care Reviewed With: patient  Progress: improving   Patient rested comfortably in bed throughout the night without complaints of pain.  Patient remains on RA.  Heart rate in the mid 40's overnight.  Patient very anxious to get home.  No new concerns at this time, will continue to monitor.

## 2021-01-14 NOTE — PLAN OF CARE
Goal Outcome Evaluation:  Plan of Care Reviewed With: patient  Progress: improving  Outcome Summary: Pt has rested comfortably throughout shift. HR still maintaining between 45-55 bpm. Pt still asymptomatic of covid. Will continue to monitor.

## 2021-01-14 NOTE — PROGRESS NOTES
Continued Stay Note   [facility]     Patient Name: Julisa Marinelli  MRN: [id number]  Today's Date: 1/14/2021    Admit Date: 1/11/2021    Discharge Plan     Row Name 01/14/21 1019       Plan    Plan  Anticipate routine home.    Plan Comments  SW called pt room phone with [facility]  (Radha: [phone]) to assess uninsured status. Pt reports that [facility] screened her and she does not qualify for Medicaid and pt is expecting Financial Assistance packet, verified in [facility] documentation. SW also offered to schedule [facility] appointment as pt is current with [facility]. Pt is agreeable and requests a Tuesday appointment. SW called [facility] to inform that the pt has d/c orders and ask to deliver Financial Assistance packet, left VM. SW also called [facility] and was able to schedule appointment for 1/26 @ 10:45am. Appt added to AVS.        Phone communication only - no physical contact with patient or family.    YAHAIRA Joyner, LSW    Office: [phone]  Cell: [phone]  Fax: [phone]  E-mail: [email or web address]

## 2021-01-14 NOTE — PROGRESS NOTES
Case Management Discharge Note      Final Note: Home    Provided Post Acute Provider List?: N/A  N/A Provider List Comment: no needs identified at this time    Selected Continued Care - Discharged on 1/14/2021 Admission date: 1/11/2021 - Discharge disposition: Home or Self Care                 Final Discharge Disposition Code: 01 - home or self-care

## 2021-01-15 ENCOUNTER — READMISSION MANAGEMENT (OUTPATIENT)
Dept: CALL CENTER | Facility: HOSPITAL | Age: 51
End: 2021-01-15

## 2021-01-15 NOTE — OUTREACH NOTE
Prep Survey      Responses   [facility]?  [facility]   Is LACE score < 7 ?  Yes   Emergency Room discharge w/ pulse ox?  No   Eligibility  Readm Mgmt   Discharge diagnosis  Sinus bradycardia Chest pain covid   Does the patient have one of the following disease processes/diagnoses(primary or secondary)?  COVID-19   Does the patient have Home health ordered?  No   Is there a DME ordered?  No   Prep survey completed?  Yes          Jolly Benoit RN

## 2021-01-15 NOTE — OUTREACH NOTE
COVID-19 Week 1 Survey      Responses   [facility] patient discharged from?  [facility]   Does the patient have one of the following disease processes/diagnoses(primary or secondary)?  COVID-19   COVID-19 underlying condition?  None   Call Number  Call 1   Week 1 Call successful?  Yes   Call start time  1336   Call end time  1359   General alerts for this patient  Speaks Lao.   If primary language is not English what is the name and relationship or agency of  used?  [employer], #[id number].   Meds reviewed with patient/caregiver?  Yes   Is the patient having any side effects they believe may be caused by any medication additions or changes?  No   Does the patient have all medications ordered at discharge?  Yes   Is the patient taking all medications as directed (includes completed medication regime)?  Yes   Medication comments  States was told to hold Vasotec due to low pulse. States will monitor BP.   Does the patient have a primary care provider?   Yes   Does the patient have an appointment with their PCP or specialist within 7 days of discharge?  Greater than 7 days   What is preventing the patient from scheduling follow up appointments within 7 days of discharge?  Earlier appointment not available   Nursing Interventions  Verified appointment date/time/provider   Has the patient kept scheduled appointments due by today?  N/A   Has home health visited the patient within 72 hours of discharge?  N/A   Psychosocial issues?  No   Did the patient receive a copy of their discharge instructions?  Yes   Did the patient receive a copy of COVID-19 specific instructions?  Yes   Nursing interventions  Reviewed instructions with patient   What is the patient's perception of their health status since discharge?  Improving   Does the patient have any of the following symptoms?  None   Nursing Interventions  Nurse provided patient education   Pulse Ox monitoring  Intermittent   Pulse Ox device  source  Patient   O2 Sat comments  96% on room air with pulse of 56.   O2 Sat: education provided  Sat levels, Monitoring frequency, When to seek care   O2 Sat education comments  Advised to return to ER if O2 sats remain below 92% , pulse below 50, or develops s/s.   Is the patient/caregiver able to teach back steps to recovery at home?  Set small, achievable goals for return to baseline health, Eat a well-balance diet, Rest and rebuild strength, gradually increase activity   If the patient is a current smoker, are they able to teach back resources for cessation?  Not a smoker   Is the patient/caregiver able to teach back the hierarchy of who to call/visit for symptoms/problems? PCP, Specialist, Home health nurse, Urgent Care, ED, 911  Yes   COVID-19 call completed?  Yes   Wrap up additional comments  Patient states is feeling better. Reports pulse at 56 currently. Denies any cardiac s/s or covid s/s. States continues with quarantine. Reviewed sanitize household surfaces, check temp bid, replace toothbrush/paste. Denies any needs today.          Kadie Alexandre RN

## 2021-01-16 ENCOUNTER — READMISSION MANAGEMENT (OUTPATIENT)
Dept: CALL CENTER | Facility: HOSPITAL | Age: 51
End: 2021-01-16

## 2021-01-16 NOTE — OUTREACH NOTE
COVID-19 Week 1 Survey      Responses   [facility] patient discharged from?  [facility]   Does the patient have one of the following disease processes/diagnoses(primary or secondary)?  COVID-19   COVID-19 underlying condition?  None   Call Number  Call 2   Week 1 Call successful?  No   Discharge diagnosis  Sinus bradycardia Chest pain ninfa Collins RN

## 2021-01-17 ENCOUNTER — READMISSION MANAGEMENT (OUTPATIENT)
Dept: CALL CENTER | Facility: HOSPITAL | Age: 51
End: 2021-01-17

## 2021-01-17 NOTE — OUTREACH NOTE
COVID-19 Week 1 Survey      Responses   [facility] patient discharged from?  [facility]   Does the patient have one of the following disease processes/diagnoses(primary or secondary)?  COVID-19   COVID-19 underlying condition?  None   Call Number  Call 3   Week 1 Call successful?  No [Did not reach pt with ]   Discharge diagnosis  Sinus bradycardia Chest pain ninfa Sotelo RN

## 2021-01-20 ENCOUNTER — READMISSION MANAGEMENT (OUTPATIENT)
Dept: CALL CENTER | Facility: HOSPITAL | Age: 51
End: 2021-01-20

## 2021-01-20 NOTE — OUTREACH NOTE
COVID-19 Week 2 Survey      Responses   [facility] patient discharged from?  [facility]   Does the patient have one of the following disease processes/diagnoses(primary or secondary)?  COVID-19   COVID-19 underlying condition?  None   Call Number  Call 1   COVID-19 Week 2: Call 1 attempt successful?  Yes   Call start time  1557   Call end time  1603   Person spoke with today (if not patient) and relationship  daughter, Corinne Washington reviewed with patient/caregiver?  Yes   Is the patient having any side effects they believe may be caused by any medication additions or changes?  No   Does the patient have all medications ordered at discharge?  Yes   Is the patient taking all medications as directed (includes completed medication regime)?  Yes   Does the patient have a primary care provider?   Yes   Does the patient have an appointment with their PCP or specialist within 7 days of discharge?  Greater than 7 days   What is preventing the patient from scheduling follow up appointments within 7 days of discharge?  Earlier appointment not available   Nursing Interventions  Verified appointment date/time/provider   Has the patient kept scheduled appointments due by today?  N/A   Has home health visited the patient within 72 hours of discharge?  N/A   Psychosocial issues?  No   Did the patient receive a copy of their discharge instructions?  Yes   Did the patient receive a copy of COVID-19 specific instructions?  Yes   Nursing interventions  Reviewed instructions with patient   What is the patient's perception of their health status since discharge?  Improving   Does the patient have any of the following symptoms?  None   Nursing Interventions  Nurse provided patient education   Pulse Ox monitoring  Intermittent   Pulse Ox device source  Other   O2 Sat comments  -- [has been stable, family friend comes to check O2 sats]   O2 Sat: education provided  Sat levels, Monitoring frequency, When to seek care   Is the  patient/caregiver able to teach back steps to recovery at home?  Set small, achievable goals for return to baseline health, Rest and rebuild strength, gradually increase activity, Eat a well-balance diet   Is the patient/caregiver able to teach back the hierarchy of who to call/visit for symptoms/problems? PCP, Specialist, Home health nurse, Urgent Care, ED, 911  Yes   COVID-19 call completed?  Yes          Debby Deluca RN

## 2022-07-27 ENCOUNTER — HOSPITAL ENCOUNTER (OUTPATIENT)
Facility: HOSPITAL | Age: 52
Setting detail: OBSERVATION
Discharge: HOME | End: 2022-07-28
Attending: EMERGENCY MEDICINE | Admitting: EMERGENCY MEDICINE

## 2022-07-27 ENCOUNTER — APPOINTMENT (OUTPATIENT)
Dept: GENERAL RADIOLOGY | Facility: HOSPITAL | Age: 52
End: 2022-07-27

## 2022-07-27 DIAGNOSIS — M25.512 ACUTE PAIN OF LEFT SHOULDER: ICD-10-CM

## 2022-07-27 DIAGNOSIS — R07.9 CHEST PAIN, UNSPECIFIED TYPE: Primary | ICD-10-CM

## 2022-07-27 LAB
ALBUMIN SERPL-MCNC: 4.7 G/DL (ref 3.5–5.2)
ALBUMIN/GLOB SERPL: 2 G/DL
ALP SERPL-CCNC: 87 U/L (ref 39–117)
ALT SERPL W P-5'-P-CCNC: 24 U/L (ref 1–33)
ANION GAP SERPL CALCULATED.3IONS-SCNC: 11 MMOL/L (ref 5–15)
AST SERPL-CCNC: 19 U/L (ref 1–32)
BASOPHILS # BLD AUTO: 0 10*3/MM3 (ref 0–0.2)
BASOPHILS NFR BLD AUTO: 0.5 % (ref 0–1.5)
BILIRUB SERPL-MCNC: 0.8 MG/DL (ref 0–1.2)
BUN SERPL-MCNC: 13 MG/DL (ref 6–20)
BUN/CREAT SERPL: 22.8 (ref 7–25)
CALCIUM SPEC-SCNC: 9.2 MG/DL (ref 8.6–10.5)
CHLORIDE SERPL-SCNC: 105 MMOL/L (ref 98–107)
CO2 SERPL-SCNC: 27 MMOL/L (ref 22–29)
CREAT SERPL-MCNC: 0.57 MG/DL (ref 0.57–1)
DEPRECATED RDW RBC AUTO: 41.6 FL (ref 37–54)
EGFRCR SERPLBLD CKD-EPI 2021: 109.5 ML/MIN/1.73
EOSINOPHIL # BLD AUTO: 0.1 10*3/MM3 (ref 0–0.4)
EOSINOPHIL NFR BLD AUTO: 1.6 % (ref 0.3–6.2)
ERYTHROCYTE [DISTWIDTH] IN BLOOD BY AUTOMATED COUNT: 12.9 % (ref 12.3–15.4)
GLOBULIN UR ELPH-MCNC: 2.4 GM/DL
GLUCOSE SERPL-MCNC: 90 MG/DL (ref 65–99)
HCT VFR BLD AUTO: 43.2 % (ref 34–46.6)
HGB BLD-MCNC: 14.7 G/DL (ref 12–15.9)
LYMPHOCYTES # BLD AUTO: 2 10*3/MM3 (ref 0.7–3.1)
LYMPHOCYTES NFR BLD AUTO: 33 % (ref 19.6–45.3)
MCH RBC QN AUTO: 31.3 PG (ref 26.6–33)
MCHC RBC AUTO-ENTMCNC: 34 G/DL (ref 31.5–35.7)
MCV RBC AUTO: 92.3 FL (ref 79–97)
MONOCYTES # BLD AUTO: 0.5 10*3/MM3 (ref 0.1–0.9)
MONOCYTES NFR BLD AUTO: 7.5 % (ref 5–12)
NEUTROPHILS NFR BLD AUTO: 3.5 10*3/MM3 (ref 1.7–7)
NEUTROPHILS NFR BLD AUTO: 57.4 % (ref 42.7–76)
NRBC BLD AUTO-RTO: 0.1 /100 WBC (ref 0–0.2)
NT-PROBNP SERPL-MCNC: 39.8 PG/ML (ref 0–900)
PLATELET # BLD AUTO: 332 10*3/MM3 (ref 140–450)
PMV BLD AUTO: 7.2 FL (ref 6–12)
POTASSIUM SERPL-SCNC: 4.3 MMOL/L (ref 3.5–5.2)
PROT SERPL-MCNC: 7.1 G/DL (ref 6–8.5)
RBC # BLD AUTO: 4.68 10*6/MM3 (ref 3.77–5.28)
SARS-COV-2 RNA PNL SPEC NAA+PROBE: NOT DETECTED
SODIUM SERPL-SCNC: 143 MMOL/L (ref 136–145)
TROPONIN T SERPL-MCNC: <0.01 NG/ML (ref 0–0.03)
WBC NRBC COR # BLD: 6.2 10*3/MM3 (ref 3.4–10.8)

## 2022-07-27 PROCEDURE — 93005 ELECTROCARDIOGRAM TRACING: CPT

## 2022-07-27 PROCEDURE — 83880 ASSAY OF NATRIURETIC PEPTIDE: CPT | Performed by: NURSE PRACTITIONER

## 2022-07-27 PROCEDURE — 99284 EMERGENCY DEPT VISIT MOD MDM: CPT

## 2022-07-27 PROCEDURE — 87635 SARS-COV-2 COVID-19 AMP PRB: CPT | Performed by: EMERGENCY MEDICINE

## 2022-07-27 PROCEDURE — G0378 HOSPITAL OBSERVATION PER HR: HCPCS

## 2022-07-27 PROCEDURE — 80053 COMPREHEN METABOLIC PANEL: CPT | Performed by: NURSE PRACTITIONER

## 2022-07-27 PROCEDURE — 84443 ASSAY THYROID STIM HORMONE: CPT | Performed by: PHYSICIAN ASSISTANT

## 2022-07-27 PROCEDURE — 71045 X-RAY EXAM CHEST 1 VIEW: CPT

## 2022-07-27 PROCEDURE — 36415 COLL VENOUS BLD VENIPUNCTURE: CPT

## 2022-07-27 PROCEDURE — 93005 ELECTROCARDIOGRAM TRACING: CPT | Performed by: EMERGENCY MEDICINE

## 2022-07-27 PROCEDURE — C9803 HOPD COVID-19 SPEC COLLECT: HCPCS | Performed by: EMERGENCY MEDICINE

## 2022-07-27 PROCEDURE — 85025 COMPLETE CBC W/AUTO DIFF WBC: CPT | Performed by: NURSE PRACTITIONER

## 2022-07-27 PROCEDURE — 84484 ASSAY OF TROPONIN QUANT: CPT | Performed by: EMERGENCY MEDICINE

## 2022-07-27 PROCEDURE — 84484 ASSAY OF TROPONIN QUANT: CPT | Performed by: NURSE PRACTITIONER

## 2022-07-27 RX ORDER — ONDANSETRON 2 MG/ML
4 INJECTION INTRAMUSCULAR; INTRAVENOUS EVERY 6 HOURS PRN
Status: DISCONTINUED | OUTPATIENT
Start: 2022-07-27 | End: 2022-07-28 | Stop reason: HOSPADM

## 2022-07-27 RX ORDER — MORPHINE SULFATE 2 MG/ML
1 INJECTION, SOLUTION INTRAMUSCULAR; INTRAVENOUS EVERY 4 HOURS PRN
Status: DISCONTINUED | OUTPATIENT
Start: 2022-07-27 | End: 2022-07-28 | Stop reason: HOSPADM

## 2022-07-27 RX ORDER — ASPIRIN 81 MG/1
324 TABLET, CHEWABLE ORAL ONCE
Status: COMPLETED | OUTPATIENT
Start: 2022-07-27 | End: 2022-07-27

## 2022-07-27 RX ORDER — NITROGLYCERIN 0.4 MG/1
0.4 TABLET SUBLINGUAL
Status: DISCONTINUED | OUTPATIENT
Start: 2022-07-27 | End: 2022-07-28 | Stop reason: HOSPADM

## 2022-07-27 RX ORDER — SODIUM CHLORIDE 0.9 % (FLUSH) 0.9 %
10 SYRINGE (ML) INJECTION AS NEEDED
Status: DISCONTINUED | OUTPATIENT
Start: 2022-07-27 | End: 2022-07-28 | Stop reason: HOSPADM

## 2022-07-27 RX ORDER — NALOXONE HCL 0.4 MG/ML
0.4 VIAL (ML) INJECTION
Status: DISCONTINUED | OUTPATIENT
Start: 2022-07-27 | End: 2022-07-28 | Stop reason: HOSPADM

## 2022-07-27 RX ORDER — CHOLECALCIFEROL (VITAMIN D3) 125 MCG
5 CAPSULE ORAL NIGHTLY PRN
Status: DISCONTINUED | OUTPATIENT
Start: 2022-07-27 | End: 2022-07-28 | Stop reason: HOSPADM

## 2022-07-27 RX ORDER — ACETAMINOPHEN 325 MG/1
650 TABLET ORAL EVERY 4 HOURS PRN
Status: DISCONTINUED | OUTPATIENT
Start: 2022-07-27 | End: 2022-07-28 | Stop reason: HOSPADM

## 2022-07-27 RX ADMIN — ASPIRIN 324 MG: 81 TABLET, CHEWABLE ORAL at 17:19

## 2022-07-28 ENCOUNTER — APPOINTMENT (OUTPATIENT)
Dept: NUCLEAR MEDICINE | Facility: HOSPITAL | Age: 52
End: 2022-07-28

## 2022-07-28 ENCOUNTER — APPOINTMENT (OUTPATIENT)
Dept: RESPIRATORY THERAPY | Facility: HOSPITAL | Age: 52
End: 2022-07-28

## 2022-07-28 ENCOUNTER — APPOINTMENT (OUTPATIENT)
Dept: CARDIOLOGY | Facility: HOSPITAL | Age: 52
End: 2022-07-28

## 2022-07-28 VITALS
BODY MASS INDEX: 31.34 KG/M2 | WEIGHT: 166 LBS | HEIGHT: 61 IN | OXYGEN SATURATION: 96 % | TEMPERATURE: 98.3 F | RESPIRATION RATE: 16 BRPM | DIASTOLIC BLOOD PRESSURE: 76 MMHG | SYSTOLIC BLOOD PRESSURE: 129 MMHG | HEART RATE: 55 BPM

## 2022-07-28 DIAGNOSIS — R00.1 BRADYCARDIA, SINUS: Primary | ICD-10-CM

## 2022-07-28 LAB
BH CV ECHO MEAS - ACS: 1.92 CM
BH CV ECHO MEAS - AO MAX PG: 7.6 MMHG
BH CV ECHO MEAS - AO MEAN PG: 4.6 MMHG
BH CV ECHO MEAS - AO ROOT DIAM: 3.2 CM
BH CV ECHO MEAS - AO V2 MAX: 137.9 CM/SEC
BH CV ECHO MEAS - AO V2 VTI: 32.4 CM
BH CV ECHO MEAS - AVA(I,D): 1.49 CM2
BH CV ECHO MEAS - EDV(CUBED): 93.9 ML
BH CV ECHO MEAS - EDV(MOD-SP4): 77.6 ML
BH CV ECHO MEAS - EF(MOD-BP): 67 %
BH CV ECHO MEAS - EF(MOD-SP4): 67 %
BH CV ECHO MEAS - ESV(CUBED): 42.2 ML
BH CV ECHO MEAS - ESV(MOD-SP4): 25.6 ML
BH CV ECHO MEAS - FS: 23.4 %
BH CV ECHO MEAS - IVS/LVPW: 0.91 CM
BH CV ECHO MEAS - IVSD: 1.11 CM
BH CV ECHO MEAS - LA A2CS (ATRIAL LENGTH): 3 CM
BH CV ECHO MEAS - LV DIASTOLIC VOL/BSA (35-75): 45 CM2
BH CV ECHO MEAS - LV MASS(C)D: 191.8 GRAMS
BH CV ECHO MEAS - LV MAX PG: 2.7 MMHG
BH CV ECHO MEAS - LV MEAN PG: 1.29 MMHG
BH CV ECHO MEAS - LV SYSTOLIC VOL/BSA (12-30): 14.9 CM2
BH CV ECHO MEAS - LV V1 MAX: 81.4 CM/SEC
BH CV ECHO MEAS - LV V1 VTI: 16.6 CM
BH CV ECHO MEAS - LVIDD: 4.5 CM
BH CV ECHO MEAS - LVIDS: 3.5 CM
BH CV ECHO MEAS - LVOT AREA: 2.9 CM2
BH CV ECHO MEAS - LVOT DIAM: 1.93 CM
BH CV ECHO MEAS - LVPWD: 1.21 CM
BH CV ECHO MEAS - MV A MAX VEL: 64.8 CM/SEC
BH CV ECHO MEAS - MV DEC SLOPE: 282.4 CM/SEC2
BH CV ECHO MEAS - MV DEC TIME: 0.23 MSEC
BH CV ECHO MEAS - MV E MAX VEL: 64.8 CM/SEC
BH CV ECHO MEAS - MV E/A: 1
BH CV ECHO MEAS - MV MAX PG: 1.82 MMHG
BH CV ECHO MEAS - MV MEAN PG: 0.57 MMHG
BH CV ECHO MEAS - MV V2 VTI: 21.4 CM
BH CV ECHO MEAS - MVA(VTI): 2.26 CM2
BH CV ECHO MEAS - PA V2 MAX: 128.4 CM/SEC
BH CV ECHO MEAS - PI END-D VEL: 100.2 CM/SEC
BH CV ECHO MEAS - RV MAX PG: 2.05 MMHG
BH CV ECHO MEAS - RV V1 MAX: 71.6 CM/SEC
BH CV ECHO MEAS - RV V1 VTI: 16.1 CM
BH CV ECHO MEAS - RVDD: 2.6 CM
BH CV ECHO MEAS - SI(MOD-SP4): 30.2 ML/M2
BH CV ECHO MEAS - SV(LVOT): 48.3 ML
BH CV ECHO MEAS - SV(MOD-SP4): 52 ML
BH CV REST NUCLEAR ISOTOPE DOSE: 11 MCI
BH CV STRESS BP STAGE 1: NORMAL
BH CV STRESS BP STAGE 2: NORMAL
BH CV STRESS BP STAGE 3: NORMAL
BH CV STRESS COMMENTS STAGE 1: NORMAL
BH CV STRESS DOSE REGADENOSON STAGE 1: 0.4
BH CV STRESS DURATION MIN STAGE 1: 3
BH CV STRESS DURATION MIN STAGE 2: 3
BH CV STRESS DURATION MIN STAGE 3: 3
BH CV STRESS DURATION SEC STAGE 1: 0
BH CV STRESS DURATION SEC STAGE 2: 0
BH CV STRESS DURATION SEC STAGE 3: 0
BH CV STRESS GRADE STAGE 1: 10
BH CV STRESS GRADE STAGE 2: 12
BH CV STRESS GRADE STAGE 3: 14
BH CV STRESS HR STAGE 1: 78
BH CV STRESS HR STAGE 2: 105
BH CV STRESS HR STAGE 3: 150
BH CV STRESS METS STAGE 1: 5
BH CV STRESS METS STAGE 2: 7.5
BH CV STRESS METS STAGE 3: 10
BH CV STRESS NUCLEAR ISOTOPE DOSE: 32.6 MCI
BH CV STRESS PROTOCOL 1: NORMAL
BH CV STRESS RECOVERY BP: NORMAL MMHG
BH CV STRESS RECOVERY HR: 78 BPM
BH CV STRESS SPEED STAGE 1: 1.7
BH CV STRESS SPEED STAGE 2: 2.5
BH CV STRESS SPEED STAGE 3: 3.4
BH CV STRESS STAGE 1: 1
BH CV STRESS STAGE 2: 2
BH CV STRESS STAGE 3: 3
LV EF 2D ECHO EST: 60 %
LV EF NUC BP: 58 %
MAXIMAL PREDICTED HEART RATE: 168 BPM
MAXIMAL PREDICTED HEART RATE: 168 BPM
PERCENT MAX PREDICTED HR: 89.29 %
STRESS BASELINE BP: NORMAL MMHG
STRESS BASELINE HR: 62 BPM
STRESS PERCENT HR: 105 %
STRESS POST ESTIMATED WORKLOAD: 10.1 METS
STRESS POST EXERCISE DUR MIN: 9 MIN
STRESS POST EXERCISE DUR SEC: 1 SEC
STRESS POST PEAK BP: NORMAL MMHG
STRESS POST PEAK HR: 150 BPM
STRESS TARGET HR: 143 BPM
STRESS TARGET HR: 143 BPM
TROPONIN T SERPL-MCNC: <0.01 NG/ML (ref 0–0.03)
TSH SERPL DL<=0.05 MIU/L-ACNC: 2.41 UIU/ML (ref 0.27–4.2)

## 2022-07-28 PROCEDURE — G0378 HOSPITAL OBSERVATION PER HR: HCPCS

## 2022-07-28 PROCEDURE — 78452 HT MUSCLE IMAGE SPECT MULT: CPT | Performed by: INTERNAL MEDICINE

## 2022-07-28 PROCEDURE — 93306 TTE W/DOPPLER COMPLETE: CPT | Performed by: INTERNAL MEDICINE

## 2022-07-28 PROCEDURE — 99244 OFF/OP CNSLTJ NEW/EST MOD 40: CPT | Performed by: INTERNAL MEDICINE

## 2022-07-28 PROCEDURE — 78452 HT MUSCLE IMAGE SPECT MULT: CPT

## 2022-07-28 PROCEDURE — A9502 TC99M TETROFOSMIN: HCPCS | Performed by: EMERGENCY MEDICINE

## 2022-07-28 PROCEDURE — 93018 CV STRESS TEST I&R ONLY: CPT | Performed by: INTERNAL MEDICINE

## 2022-07-28 PROCEDURE — 93306 TTE W/DOPPLER COMPLETE: CPT

## 2022-07-28 PROCEDURE — 93017 CV STRESS TEST TRACING ONLY: CPT

## 2022-07-28 PROCEDURE — 0 TECHNETIUM TETROFOSMIN KIT: Performed by: EMERGENCY MEDICINE

## 2022-07-28 RX ORDER — PANTOPRAZOLE SODIUM 40 MG/1
40 TABLET, DELAYED RELEASE ORAL
Qty: 30 TABLET | Refills: 0 | Status: SHIPPED | OUTPATIENT
Start: 2022-07-29 | End: 2022-08-28

## 2022-07-28 RX ORDER — ENALAPRIL MALEATE 5 MG/1
10 TABLET ORAL DAILY
Status: DISCONTINUED | OUTPATIENT
Start: 2022-07-28 | End: 2022-07-28 | Stop reason: HOSPADM

## 2022-07-28 RX ORDER — PANTOPRAZOLE SODIUM 40 MG/1
40 TABLET, DELAYED RELEASE ORAL
Status: DISCONTINUED | OUTPATIENT
Start: 2022-07-28 | End: 2022-07-28 | Stop reason: HOSPADM

## 2022-07-28 RX ADMIN — TETROFOSMIN 1 DOSE: 1.38 INJECTION, POWDER, LYOPHILIZED, FOR SOLUTION INTRAVENOUS at 11:11

## 2022-07-28 RX ADMIN — THEOPHYLLINE ANHYDROUS 100 MG: 100 CAPSULE, EXTENDED RELEASE ORAL at 15:35

## 2022-07-28 RX ADMIN — ENALAPRIL MALEATE 10 MG: 5 TABLET ORAL at 12:25

## 2022-07-28 RX ADMIN — TETROFOSMIN 1 DOSE: 1.38 INJECTION, POWDER, LYOPHILIZED, FOR SOLUTION INTRAVENOUS at 11:13

## 2022-07-28 RX ADMIN — PANTOPRAZOLE SODIUM 40 MG: 40 TABLET, DELAYED RELEASE ORAL at 12:25

## 2022-08-05 LAB — QT INTERVAL: 410 MS

## 2022-08-15 PROCEDURE — 93228 REMOTE 30 DAY ECG REV/REPORT: CPT | Performed by: INTERNAL MEDICINE

## 2022-08-19 ENCOUNTER — OFFICE VISIT (OUTPATIENT)
Dept: CARDIOLOGY | Facility: CLINIC | Age: 52
End: 2022-08-19

## 2022-08-19 VITALS
SYSTOLIC BLOOD PRESSURE: 137 MMHG | BODY MASS INDEX: 30.21 KG/M2 | WEIGHT: 160 LBS | HEART RATE: 48 BPM | DIASTOLIC BLOOD PRESSURE: 76 MMHG | OXYGEN SATURATION: 98 % | HEIGHT: 61 IN

## 2022-08-19 DIAGNOSIS — R00.1 BRADYCARDIA, SINUS: Primary | ICD-10-CM

## 2022-08-19 DIAGNOSIS — R07.9 CHEST PAIN, UNSPECIFIED TYPE: ICD-10-CM

## 2022-08-19 DIAGNOSIS — R53.83 OTHER FATIGUE: ICD-10-CM

## 2022-08-19 DIAGNOSIS — I10 PRIMARY HYPERTENSION: ICD-10-CM

## 2022-08-19 DIAGNOSIS — R06.83 SNORING: ICD-10-CM

## 2022-08-19 PROCEDURE — 93000 ELECTROCARDIOGRAM COMPLETE: CPT | Performed by: INTERNAL MEDICINE

## 2022-08-19 PROCEDURE — 99204 OFFICE O/P NEW MOD 45 MIN: CPT | Performed by: INTERNAL MEDICINE

## 2022-08-19 NOTE — PROGRESS NOTES
CC--- sinus bradycardia and mild fatigue    Sub  Delightful 52-year-old patient was recently seen in the hospital by our cardiology department for evaluation of atypical chest pain syndrome.  Incidentally was found to have sinus bradycardia.  No significant symptoms with bradycardia apart from mild fatigue.  Patient was placed on theophylline since she has no history of syncope and sent to my office for evaluation of chronic sinus bradycardia.  Patient had COVID 3 years ago and according to her which was a severe infection not during hospitalization.  Since then patient has done her problems and feels fatigued.  She has mild snoring without any formal diagnosis of any sleep apnea.  Recent evaluation in the hospital showed normal EF without any ischemia.  TSH was normal.  Patient had normal chronotropic competency on treadmill testing.        Past Medical History:   Diagnosis Date   • Hypertension      History reviewed. No pertinent surgical history.  History reviewed. No pertinent family history.  Social History     Tobacco Use   • Smoking status: Never Smoker   • Smokeless tobacco: Never Used   Vaping Use   • Vaping Use: Never used   Substance Use Topics   • Alcohol use: Not Currently   • Drug use: Never       Review of Systems  General: Positive   for fatigue and tiredness  Eyes: No redness  Cardiovascular: No chest pain  Respiratory:   No  shortness of breath  Gastrointestinal: No nausea or vomiting, bleeding  Genitourinary: no hematuria or dysuria  Musculoskeletal: No arthralgia or myalgia  Skin: No rash  Neurologic: No numbness, tingling, syncope  Hematologic/Lymphatic: No abnormal bleeding      Physical Exam    General:      well developed, well nourished, in no acute distress.    Head:      normocephalic and atraumatic.    Eyes:      PERRL/EOM intact, conjunctivae and sclerae clear without nystagmus.    Neck:      no thyromegaly, trachea central with normal respiratory effort  Lungs:      clear bilaterally to  auscultation.    Heart:       regular rate and rhythm, S1, S2 without murmurs, rubs, or gallops  Skin:      intact without lesions or rashes.    Psych:      alert and cooperative; normal mood and affect; normal attention span and concentration.      Extremities with out any cyanosis or clubbing          Assessment and plan    Chronic sinus bradycardia without syncope  Symptoms of mild fatigue  Symptoms of snoring  Patient to be referred to the sleep clinic for evaluation of any obstructive or central sleep apnea  No indication for pacing since the patient has mild symptoms of fatigue without any syncope with normal chronotropic competency.  Continue theophylline.  Mild hypertension well-controlled.    Recent hospital notes from cardiology notes and investigations reviewed.  Patient has normal TSH, normal BNP normal CBC and BMP.  Evaluate this patient after few months.    Prescriptions can be refilled electronically.      Patient was accompanied by her daughter who was translating her Lithuanian language.      ECG 12 Lead    Date/Time: 8/19/2022 2:10 PM  Performed by: Leobardo Sigala MD  Authorized by: Leobardo Sigala MD   Comparison: compared with previous ECG   Similar to previous ECG  Rhythm: sinus bradycardia  Rate: normal  Conduction: conduction normal  QRS axis: normal            Electronically signed by Leobardo Sigala MD, 08/19/22, 2:09 PM EDT.

## 2022-10-03 ENCOUNTER — HOSPITAL ENCOUNTER (EMERGENCY)
Facility: HOSPITAL | Age: 52
Discharge: HOME | End: 2022-10-03
Attending: EMERGENCY MEDICINE | Admitting: EMERGENCY MEDICINE

## 2022-10-03 ENCOUNTER — APPOINTMENT (OUTPATIENT)
Dept: GENERAL RADIOLOGY | Facility: HOSPITAL | Age: 52
End: 2022-10-03

## 2022-10-03 VITALS
OXYGEN SATURATION: 99 % | SYSTOLIC BLOOD PRESSURE: 120 MMHG | HEIGHT: 61 IN | RESPIRATION RATE: 16 BRPM | TEMPERATURE: 98.4 F | WEIGHT: 160.5 LBS | DIASTOLIC BLOOD PRESSURE: 54 MMHG | BODY MASS INDEX: 30.3 KG/M2 | HEART RATE: 48 BPM

## 2022-10-03 DIAGNOSIS — I10 HYPERTENSION, UNSPECIFIED TYPE: Primary | ICD-10-CM

## 2022-10-03 LAB
B PARAPERT DNA SPEC QL NAA+PROBE: NOT DETECTED
B PERT DNA SPEC QL NAA+PROBE: NOT DETECTED
C PNEUM DNA NPH QL NAA+NON-PROBE: NOT DETECTED
FLUAV SUBTYP SPEC NAA+PROBE: NOT DETECTED
FLUBV RNA ISLT QL NAA+PROBE: NOT DETECTED
HADV DNA SPEC NAA+PROBE: NOT DETECTED
HCOV 229E RNA SPEC QL NAA+PROBE: NOT DETECTED
HCOV HKU1 RNA SPEC QL NAA+PROBE: NOT DETECTED
HCOV NL63 RNA SPEC QL NAA+PROBE: NOT DETECTED
HCOV OC43 RNA SPEC QL NAA+PROBE: NOT DETECTED
HMPV RNA NPH QL NAA+NON-PROBE: NOT DETECTED
HPIV1 RNA ISLT QL NAA+PROBE: NOT DETECTED
HPIV2 RNA SPEC QL NAA+PROBE: NOT DETECTED
HPIV3 RNA NPH QL NAA+PROBE: NOT DETECTED
HPIV4 P GENE NPH QL NAA+PROBE: NOT DETECTED
M PNEUMO IGG SER IA-ACNC: NOT DETECTED
QT INTERVAL: 416 MS
RHINOVIRUS RNA SPEC NAA+PROBE: NOT DETECTED
RSV RNA NPH QL NAA+NON-PROBE: NOT DETECTED
SARS-COV-2 RNA NPH QL NAA+NON-PROBE: NOT DETECTED

## 2022-10-03 PROCEDURE — 93005 ELECTROCARDIOGRAM TRACING: CPT

## 2022-10-03 PROCEDURE — 99284 EMERGENCY DEPT VISIT MOD MDM: CPT

## 2022-10-03 PROCEDURE — C9803 HOPD COVID-19 SPEC COLLECT: HCPCS | Performed by: EMERGENCY MEDICINE

## 2022-10-03 PROCEDURE — 0202U NFCT DS 22 TRGT SARS-COV-2: CPT | Performed by: EMERGENCY MEDICINE

## 2022-10-03 PROCEDURE — 71045 X-RAY EXAM CHEST 1 VIEW: CPT

## 2022-10-03 RX ORDER — CLONIDINE HYDROCHLORIDE 0.1 MG/1
0.2 TABLET ORAL ONCE
Status: COMPLETED | OUTPATIENT
Start: 2022-10-03 | End: 2022-10-03

## 2022-10-03 RX ADMIN — CLONIDINE HYDROCHLORIDE 0.2 MG: 0.1 TABLET ORAL at 03:53

## 2022-10-03 NOTE — ED PROVIDER NOTES
"Subjective   History of Present Illness  Chief complaint: High blood pressure    52-year-old female presents with high blood pressure.  Patient states she has had some cough and congestion since yesterday.  She was having some trouble sleeping tonight and she checked her blood pressure.  She found that the blood pressure was very elevated so she decided to come to the emergency room.  She denies any chest pain or shortness of breath.  She has had no headache.  She denies any nausea, vomiting, diarrhea.  She has had no fever.  No known sick contacts.  She takes enalapril for hypertension.  She states she has not missed any doses.    History provided by:  Patient      Review of Systems   Constitutional: Negative for fever.   HENT: Positive for congestion.    Eyes: Negative for redness.   Respiratory: Positive for cough. Negative for shortness of breath.    Cardiovascular: Negative for chest pain.   Gastrointestinal: Negative for abdominal pain, diarrhea and vomiting.   Genitourinary: Negative for dysuria.   Musculoskeletal: Negative for back pain.   Skin: Negative for rash.   Neurological: Negative for dizziness and headaches.   Psychiatric/Behavioral: Negative for confusion.       Past Medical History:   Diagnosis Date   • Bradycardia    • Hypertension        Allergies   Allergen Reactions   • Penicillins Unknown - Low Severity       History reviewed. No pertinent surgical history.    History reviewed. No pertinent family history.    Social History     Socioeconomic History   • Marital status: Significant Other   Tobacco Use   • Smoking status: Never Smoker   • Smokeless tobacco: Never Used   Vaping Use   • Vaping Use: Never used   Substance and Sexual Activity   • Alcohol use: Not Currently   • Drug use: Never   • Sexual activity: Defer       /65   Pulse 53   Temp 98.4 °F (36.9 °C)   Resp 16   Ht 154.9 cm (61\")   Wt 72.8 kg (160 lb 7.9 oz)   SpO2 100%   BMI 30.33 kg/m²       Objective   Physical " Exam  Vitals and nursing note reviewed.   Constitutional:       Appearance: Normal appearance. She is well-developed.   HENT:      Head: Normocephalic and atraumatic.   Eyes:      Pupils: Pupils are equal, round, and reactive to light.   Cardiovascular:      Rate and Rhythm: Normal rate and regular rhythm.      Heart sounds: Normal heart sounds.   Pulmonary:      Effort: Pulmonary effort is normal. No respiratory distress.      Breath sounds: Normal breath sounds.   Abdominal:      General: Bowel sounds are normal.      Palpations: Abdomen is soft.      Tenderness: There is no abdominal tenderness.   Musculoskeletal:         General: Normal range of motion.      Cervical back: Normal range of motion and neck supple.   Skin:     General: Skin is warm and dry.   Neurological:      General: No focal deficit present.      Mental Status: She is alert and oriented to person, place, and time.         Procedures           ED Course      My interpretation of EKG shows sinus bradycardia, rate of 55, PVC, no ST elevation     Results for orders placed or performed during the hospital encounter of 10/03/22   Respiratory Panel PCR w/COVID-19(SARS-CoV-2) RAJESH/JHONATAN/LAUREN/PAD/COR/MAD/TIAN In-House, NP Swab in UTM/VTM, 3-4 HR TAT - Swab, Nasopharynx    Specimen: Nasopharynx; Swab   Result Value Ref Range    ADENOVIRUS, PCR Not Detected Not Detected    Coronavirus 229E Not Detected Not Detected    Coronavirus HKU1 Not Detected Not Detected    Coronavirus NL63 Not Detected Not Detected    Coronavirus OC43 Not Detected Not Detected    COVID19 Not Detected Not Detected - Ref. Range    Human Metapneumovirus Not Detected Not Detected    Human Rhinovirus/Enterovirus Not Detected Not Detected    Influenza A PCR Not Detected Not Detected    Influenza B PCR Not Detected Not Detected    Parainfluenza Virus 1 Not Detected Not Detected    Parainfluenza Virus 2 Not Detected Not Detected    Parainfluenza Virus 3 Not Detected Not Detected     Parainfluenza Virus 4 Not Detected Not Detected    RSV, PCR Not Detected Not Detected    Bordetella pertussis pcr Not Detected Not Detected    Bordetella parapertussis PCR Not Detected Not Detected    Chlamydophila pneumoniae PCR Not Detected Not Detected    Mycoplasma pneumo by PCR Not Detected Not Detected   ECG 12 Lead   Result Value Ref Range    QT Interval 416 ms          Chest x-ray reviewed by me shows no acute disease, pending radiology review.                           MDM  Patient had the above evaluation.  Results were discussed with the patient.  Chest x-ray shows no acute disease.  EKG shows no acute ischemia.  Respiratory panel is negative.  Patient was given a dose of clonidine in the emergency room and her blood pressure has come down appropriately.  Patient is well-appearing.  She will be discharged to follow-up with her primary doctor for ongoing blood pressure management.  She is requesting a refill of her Zacahry 24 prescription because she ran out 2 days ago.      Final diagnoses:   Hypertension, unspecified type       ED Disposition  ED Disposition     ED Disposition   Discharge    Condition   Stable    Comment   --             No follow-up provider specified.       Where to Get Your Medications      These medications were sent to [id number] - [place], [place] - [address] AT [facility] [address] - [phone]  - [phone] FX  [address] [place] [address]    Phone: [phone]   · theophylline 100 MG 24 hr capsule        Medication List      No changes were made to your prescriptions during this visit.          Leoncio Byrd MD  10/03/22 0231

## 2022-10-03 NOTE — ED NOTES
Pt from home, accompanied by  and daughter, with c/o elevated blood pressure. Pt does not any specific complaints. Pt woke up and checked her blood pressure and it was elevated. According to the pt's daughter, the pt has run out of her theophylline.

## 2022-10-12 ENCOUNTER — HOSPITAL ENCOUNTER (EMERGENCY)
Facility: HOSPITAL | Age: 52
Discharge: HOME | End: 2022-10-12
Attending: EMERGENCY MEDICINE | Admitting: EMERGENCY MEDICINE

## 2022-10-12 VITALS
BODY MASS INDEX: 31.77 KG/M2 | TEMPERATURE: 98.7 F | RESPIRATION RATE: 16 BRPM | HEIGHT: 60 IN | OXYGEN SATURATION: 97 % | HEART RATE: 58 BPM | SYSTOLIC BLOOD PRESSURE: 140 MMHG | WEIGHT: 161.82 LBS | DIASTOLIC BLOOD PRESSURE: 76 MMHG

## 2022-10-12 DIAGNOSIS — I10 HYPERTENSION, UNSPECIFIED TYPE: Primary | ICD-10-CM

## 2022-10-12 PROCEDURE — 99283 EMERGENCY DEPT VISIT LOW MDM: CPT

## 2022-10-12 NOTE — ED PROVIDER NOTES
Subjective   History of Present Illness  52-year-old female with elevated blood pressure today.  Patient states she forgot to take her medicine and then took it at midnight and now feels better.  Patient denies any associated headache or dizziness or chest pain or shortness of air.        Review of Systems   All other systems reviewed and are negative.      Past Medical History:   Diagnosis Date   • Bradycardia    • Hypertension        Allergies   Allergen Reactions   • Penicillins Unknown - Low Severity       No past surgical history on file.    No family history on file.    Social History     Socioeconomic History   • Marital status: Significant Other   Tobacco Use   • Smoking status: Never   • Smokeless tobacco: Never   Vaping Use   • Vaping Use: Never used   Substance and Sexual Activity   • Alcohol use: Not Currently   • Drug use: Never   • Sexual activity: Defer           Objective   Physical Exam  Constitutional:       Appearance: Normal appearance.   HENT:      Head: Normocephalic and atraumatic.      Mouth/Throat:      Mouth: Mucous membranes are moist.      Pharynx: Oropharynx is clear.   Eyes:      Conjunctiva/sclera: Conjunctivae normal.      Pupils: Pupils are equal, round, and reactive to light.   Cardiovascular:      Rate and Rhythm: Normal rate and regular rhythm.      Heart sounds: Normal heart sounds.   Pulmonary:      Effort: Pulmonary effort is normal.      Breath sounds: Normal breath sounds.   Abdominal:      General: Bowel sounds are normal. There is no distension.      Palpations: Abdomen is soft.      Tenderness: There is no abdominal tenderness.   Musculoskeletal:         General: Normal range of motion.   Skin:     General: Skin is warm and dry.      Capillary Refill: Capillary refill takes less than 2 seconds.   Neurological:      General: No focal deficit present.      Mental Status: She is alert and oriented to person, place, and time.   Psychiatric:         Mood and Affect: Mood  normal.         Behavior: Behavior normal.         Procedures           ED Course                                           MDM  Number of Diagnoses or Management Options  Hypertension, unspecified type  Diagnosis management comments: Patient asymptomatic with stable blood pressure in the ED.       Amount and/or Complexity of Data Reviewed  Decide to obtain previous medical records or to obtain history from someone other than the patient: yes        Final diagnoses:   Hypertension, unspecified type       ED Disposition  ED Disposition     ED Disposition   Discharge    Condition   Stable    Comment   --               Follow up with your PCP             Medication List      No changes were made to your prescriptions during this visit.          Benigno Graham MD  10/12/22 0237

## 2022-10-12 NOTE — ED NOTES
Patients daughter stated that her mother forgot to take her blood pressure medication today and her blood pressure got elevated. Patient took her blood pressure medication two hours ago and it has been coming back down to normal. Patients blood pressure within normal limits at this time.

## 2022-11-01 ENCOUNTER — OFFICE VISIT (OUTPATIENT)
Dept: SLEEP MEDICINE | Facility: CLINIC | Age: 52
End: 2022-11-01

## 2022-11-01 VITALS
WEIGHT: 170 LBS | DIASTOLIC BLOOD PRESSURE: 69 MMHG | HEART RATE: 47 BPM | OXYGEN SATURATION: 99 % | HEIGHT: 60 IN | BODY MASS INDEX: 33.38 KG/M2 | SYSTOLIC BLOOD PRESSURE: 109 MMHG

## 2022-11-01 DIAGNOSIS — G47.8 NON-RESTORATIVE SLEEP: ICD-10-CM

## 2022-11-01 DIAGNOSIS — R00.1 BRADYCARDIA, SINUS: ICD-10-CM

## 2022-11-01 DIAGNOSIS — G47.30 OBSERVED SLEEP APNEA: Primary | ICD-10-CM

## 2022-11-01 DIAGNOSIS — R06.83 SNORING: ICD-10-CM

## 2022-11-01 PROCEDURE — 99203 OFFICE O/P NEW LOW 30 MIN: CPT | Performed by: INTERNAL MEDICINE

## 2022-11-01 PROCEDURE — G0463 HOSPITAL OUTPT CLINIC VISIT: HCPCS

## 2022-11-01 NOTE — PROGRESS NOTES
[address] [address]  Phone   Fax       Julisa Marinelli  [phone]   1970  52 y.o.  female      PCP:Provider, No Known    Type of service: Initial New Patient Office Visit  Date of service: 11/1/2022    Chief Complaint   Patient presents with   • Witnessed Apnea   • Snoring   • Non-restorative Sleep       History of present illness;  Julisa Marinelli 52 y.o.  is a new patient for me and was seen today for sleep related problems of snoring, non-restorative sleep and witnessed apneas. The symptoms are present for 2-month and they are persistent in nature.  The snoring is present in all positions and it is loud.  Patient has no prior surgery namely tonsillectomy, nasal surgery and UPPP.     Recently admitted to the hospital with bradycardia and was put on theophylline 24 and she has an appointment to see electrophysiologist.  She is accompanied by her daughter who speaks fluent English but this patient speaks [place].    Patient gives the following sleep history.  Sleep schedule:  Bedtime: 11 PM  Wake time: 7 AM  Normally takes about 5 minutes to fall asleep  Average hours of sleep 8  Number of naps per day none  Symptoms  In addition to snoring, nonrestorative sleep and witnessed apneas patient gives the following associated symptoms.  Have you ever awakened gasping for breath, coughing, choking: No   Change in weight,  No   Morning headaches  No   Awaken with a sore throat or dry mouth  Yes   Leg jerking at night:  No   Crawly feeling/urge sensation to move in the legs: No   Teeth grinding:No   Have you ever awakened at night with a sour taste or burning sensation in your chest:  No   Do you have muscle weakness with laughing or anger or sleep paralysis:  No   Have you ever felt paralyzed while going to sleep or waking up:  No   Sleepwalking, nightmares, No   Nocturia (urination at night): 2 times per night  Memory Problem:No  "    Past medical history: (Relevant to sleep medicine)  1. Hypertension  2. GERD  3. Bradycardia    • Medications are reviewed by me and documented in the encounter  • Allergies reviewed and documented in encounter    Social history:  Do you drive a commercial vehicle:  No   Shift work:  No   Tobacco use:  No   Alcohol use:  0 per week  Caffeinated drinks: 1    FAMILY HISTORY (Your mother, father, brothers and sisters)  1. Diabetes mellitus  2. Hypertension    REVIEW OF SYSTEMS.  Full review of systems available on the intake form which is scanned in the media tab.  The relevant positive are noted below  1. Daytime excessive sleepiness with Basye Sleepiness Scale :Total score: 8   2. Snoring  3. Fatigue      Physical exam:  Vitals:    11/01/22 1350   BP: 109/69   BP Location: Left arm   Patient Position: Sitting   Cuff Size: Adult   Pulse: (!) 47   SpO2: 99%   Weight: 77.1 kg (170 lb)   Height: 152.4 cm (60\")    Body mass index is 33.2 kg/m².    HEENT: Head is atraumatic, normocephalic  Eyes: pupils are round equal and reacting to light and accommodation, conjunctiva normal  Nose: no nasal septal defects or deviation and the nasal passages are clear, no nasal polyps,  Throat: tonsils are nonenlarged, tongue normal, oral airway Mallampati class 3  NECK: , trachea is in the midline, thyroid not enlarged  RESPIRATORY SYSTEM: Breath sounds are equal on both sides, there are no wheezes   CARDIOVASULAR SYSTEM: Heart sounds are regular rhythm and wilman rate, no edema  EXTREMITES: No cyanosis, clubbing  NEUROLOGICAL SYSTEM: Oriented x 3, no gross motor defects, gait normal    Office notes from care team reviewed. Office note dated hospital discharge summary,reviewed  Labs reviewed.  TSH Results:  TSH    TSH 7/27/22   TSH 2.410                 Assessment and plan:  · Witnessed apnea (R06.81) patient's symptoms and examination is consistent with sleep apnea (G47.30)  I have talked to the patient about the signs and symptoms " of sleep apnea. In addition, I have also discussed pathophysiology of sleep apnea.  I also discussed the complications of untreated sleep apnea including effects on hypertension, diabetes mellitus and nonrestorative sleep with hypersomnia which can increase risk for motor vehicle accidents.  Untreated sleep apnea is also a risk factor for development of atrial fibrillation, pulmonary hypertension and stroke.  Discussed in detail of various testing methods including home-based and lab based sleep studies.  Based on history and physical examination the most appropriate study is home sleep test.  The order for the sleep study is placed in [facility].  The test will be scheduled after approval from insurance. Treatment and management will be discussed after the test is completed.  Patient was given opportunity to ask questions and all the questions were answered.   · Snoring (R06.83) snoring is the sound created by turbulent airflow vibrating upper airway soft tissue.  I have also discussed factors affecting snoring including sleep deprivation, sleeping on the back and alcohol ingestion. To minimize snoring, patient is advised to have adequate sleep, sleep on the side and avoid alcohol and sedative medications before bedtime  · Daytime excessive sleepiness .  It was assessed with Parker Sleepiness Scale of Total score: 8.  There are many causes for daytime excessive sleepiness including sleep depression, shiftwork syndrome, depression and other medical disorders including heart, kidney and liver failure.  The most serious cause of excessive sleepiness is due to neurological conditions like narcolepsy/cataplexy.  But the most common cause of excessive sleepiness is due to sleep apnea with frequent awakenings during sleep time.  I have discussed safety of driving and to remain vigilant while driving.  · Obesity 1, with BMI Body mass index is 33.2 kg/m².. I have discussed the relationship between weight and sleep apnea.There is  direct correlation between weight and severity of sleep apnea.  Weight reduction is encouraged, as it is going to reduce the severity of sleep apnea. I have also discussed with the patient diet and exercise to achieve ideal body weight  · Bradycardia, has an appointment to see electrophysiologist    I have also discussed with the patient the following  • Sleep hygiene: Maintaining a regular bedtime and wake time, not to watch television or work in bed, limit caffeine-containing beverages before bed time and avoid naps during the day  • Adequate amount of sleep.  Generally most people needs about 7 to 8 hours of sleep.  • Return for 31 to 90 days after PAP setup with down load..  Patient's questions were answered.        Juan Daniel Martin MD  Sleep Medicine.  Medical Director, [facility]  11/1/2022 ,

## 2022-11-22 ENCOUNTER — HOSPITAL ENCOUNTER (OUTPATIENT)
Dept: SLEEP MEDICINE | Facility: HOSPITAL | Age: 52
Discharge: HOME | End: 2022-11-22
Admitting: INTERNAL MEDICINE

## 2022-11-22 DIAGNOSIS — G47.30 OBSERVED SLEEP APNEA: ICD-10-CM

## 2022-11-22 DIAGNOSIS — R06.83 SNORING: ICD-10-CM

## 2022-11-22 DIAGNOSIS — G47.8 NON-RESTORATIVE SLEEP: ICD-10-CM

## 2022-11-22 DIAGNOSIS — R00.1 BRADYCARDIA, SINUS: ICD-10-CM

## 2022-11-22 PROCEDURE — 95806 SLEEP STUDY UNATT&RESP EFFT: CPT | Performed by: INTERNAL MEDICINE

## 2022-11-22 PROCEDURE — 95806 SLEEP STUDY UNATT&RESP EFFT: CPT

## 2022-11-29 DIAGNOSIS — R06.83 SNORING: ICD-10-CM

## 2022-11-29 DIAGNOSIS — G47.33 OSA (OBSTRUCTIVE SLEEP APNEA): Primary | ICD-10-CM
